# Patient Record
Sex: FEMALE | Race: WHITE | Employment: FULL TIME
[De-identification: names, ages, dates, MRNs, and addresses within clinical notes are randomized per-mention and may not be internally consistent; named-entity substitution may affect disease eponyms.]

---

## 2017-01-05 DIAGNOSIS — Z12.11 COLON CANCER SCREENING: ICD-10-CM

## 2017-01-05 LAB
CONTROL: PRESENT
HEMOCCULT STL QL: NORMAL

## 2017-01-05 PROCEDURE — 82274 ASSAY TEST FOR BLOOD FECAL: CPT | Performed by: NURSE PRACTITIONER

## 2017-01-09 ENCOUNTER — TELEPHONE (OUTPATIENT)
Dept: NEUROLOGY | Facility: CLINIC | Age: 62
End: 2017-01-09

## 2017-01-16 ENCOUNTER — OFFICE VISIT (OUTPATIENT)
Dept: NEUROLOGY | Facility: CLINIC | Age: 62
End: 2017-01-16

## 2017-01-16 VITALS
HEIGHT: 66 IN | WEIGHT: 186 LBS | HEART RATE: 76 BPM | BODY MASS INDEX: 29.89 KG/M2 | DIASTOLIC BLOOD PRESSURE: 88 MMHG | SYSTOLIC BLOOD PRESSURE: 138 MMHG

## 2017-01-16 DIAGNOSIS — G20 PARKINSON'S DISEASE (HCC): Primary | ICD-10-CM

## 2017-01-16 PROCEDURE — G8484 FLU IMMUNIZE NO ADMIN: HCPCS | Performed by: PSYCHIATRY & NEUROLOGY

## 2017-01-16 PROCEDURE — 3017F COLORECTAL CA SCREEN DOC REV: CPT | Performed by: PSYCHIATRY & NEUROLOGY

## 2017-01-16 PROCEDURE — G8419 CALC BMI OUT NRM PARAM NOF/U: HCPCS | Performed by: PSYCHIATRY & NEUROLOGY

## 2017-01-16 PROCEDURE — 1036F TOBACCO NON-USER: CPT | Performed by: PSYCHIATRY & NEUROLOGY

## 2017-01-16 PROCEDURE — 3014F SCREEN MAMMO DOC REV: CPT | Performed by: PSYCHIATRY & NEUROLOGY

## 2017-01-16 PROCEDURE — 99213 OFFICE O/P EST LOW 20 MIN: CPT | Performed by: PSYCHIATRY & NEUROLOGY

## 2017-01-16 PROCEDURE — G8427 DOCREV CUR MEDS BY ELIG CLIN: HCPCS | Performed by: PSYCHIATRY & NEUROLOGY

## 2017-01-16 RX ORDER — CARBIDOPA AND LEVODOPA 50; 200 MG/1; MG/1
1 TABLET, EXTENDED RELEASE ORAL NIGHTLY
Qty: 90 TABLET | Refills: 3 | Status: SHIPPED | OUTPATIENT
Start: 2017-01-16 | End: 2017-11-20 | Stop reason: SDUPTHER

## 2017-02-13 RX ORDER — MONTELUKAST SODIUM 10 MG/1
10 TABLET ORAL NIGHTLY
Qty: 90 TABLET | Refills: 1 | Status: SHIPPED | OUTPATIENT
Start: 2017-02-13 | End: 2018-07-03 | Stop reason: SDUPTHER

## 2017-02-16 RX ORDER — OMEPRAZOLE 20 MG/1
20 CAPSULE, DELAYED RELEASE ORAL DAILY
Qty: 90 CAPSULE | Refills: 1 | Status: SHIPPED | OUTPATIENT
Start: 2017-02-16 | End: 2017-08-15 | Stop reason: SDUPTHER

## 2017-05-22 ENCOUNTER — OFFICE VISIT (OUTPATIENT)
Dept: NEUROLOGY | Age: 62
End: 2017-05-22
Payer: COMMERCIAL

## 2017-05-22 VITALS
HEIGHT: 66 IN | BODY MASS INDEX: 29.41 KG/M2 | SYSTOLIC BLOOD PRESSURE: 160 MMHG | DIASTOLIC BLOOD PRESSURE: 94 MMHG | HEART RATE: 71 BPM | WEIGHT: 183 LBS

## 2017-05-22 DIAGNOSIS — G20 PARKINSON'S DISEASE (HCC): Primary | ICD-10-CM

## 2017-05-22 PROCEDURE — 3017F COLORECTAL CA SCREEN DOC REV: CPT | Performed by: PSYCHIATRY & NEUROLOGY

## 2017-05-22 PROCEDURE — 1036F TOBACCO NON-USER: CPT | Performed by: PSYCHIATRY & NEUROLOGY

## 2017-05-22 PROCEDURE — G8427 DOCREV CUR MEDS BY ELIG CLIN: HCPCS | Performed by: PSYCHIATRY & NEUROLOGY

## 2017-05-22 PROCEDURE — 3014F SCREEN MAMMO DOC REV: CPT | Performed by: PSYCHIATRY & NEUROLOGY

## 2017-05-22 PROCEDURE — G8419 CALC BMI OUT NRM PARAM NOF/U: HCPCS | Performed by: PSYCHIATRY & NEUROLOGY

## 2017-05-22 PROCEDURE — 99213 OFFICE O/P EST LOW 20 MIN: CPT | Performed by: PSYCHIATRY & NEUROLOGY

## 2017-05-22 RX ORDER — ROPINIROLE 1 MG/1
2 TABLET, FILM COATED ORAL 3 TIMES DAILY
Qty: 180 TABLET | Refills: 3 | Status: SHIPPED | OUTPATIENT
Start: 2017-05-22 | End: 2018-01-05 | Stop reason: SDUPTHER

## 2017-08-15 RX ORDER — OMEPRAZOLE 20 MG/1
20 CAPSULE, DELAYED RELEASE ORAL DAILY
Qty: 90 CAPSULE | Refills: 1 | Status: SHIPPED | OUTPATIENT
Start: 2017-08-15 | End: 2018-02-13 | Stop reason: SDUPTHER

## 2017-09-19 ENCOUNTER — OFFICE VISIT (OUTPATIENT)
Dept: FAMILY MEDICINE CLINIC | Age: 62
End: 2017-09-19
Payer: COMMERCIAL

## 2017-09-19 VITALS
TEMPERATURE: 98.8 F | SYSTOLIC BLOOD PRESSURE: 124 MMHG | BODY MASS INDEX: 30.02 KG/M2 | RESPIRATION RATE: 16 BRPM | DIASTOLIC BLOOD PRESSURE: 84 MMHG | WEIGHT: 186 LBS | HEART RATE: 80 BPM

## 2017-09-19 DIAGNOSIS — J01.90 ACUTE BACTERIAL SINUSITIS: Primary | ICD-10-CM

## 2017-09-19 DIAGNOSIS — R05.9 COUGH: ICD-10-CM

## 2017-09-19 DIAGNOSIS — B96.89 ACUTE BACTERIAL SINUSITIS: Primary | ICD-10-CM

## 2017-09-19 DIAGNOSIS — Z12.31 ENCOUNTER FOR SCREENING MAMMOGRAM FOR BREAST CANCER: ICD-10-CM

## 2017-09-19 DIAGNOSIS — J30.1 SEASONAL ALLERGIC RHINITIS DUE TO POLLEN: ICD-10-CM

## 2017-09-19 PROCEDURE — 3014F SCREEN MAMMO DOC REV: CPT | Performed by: NURSE PRACTITIONER

## 2017-09-19 PROCEDURE — 3017F COLORECTAL CA SCREEN DOC REV: CPT | Performed by: NURSE PRACTITIONER

## 2017-09-19 PROCEDURE — 99214 OFFICE O/P EST MOD 30 MIN: CPT | Performed by: NURSE PRACTITIONER

## 2017-09-19 PROCEDURE — 1036F TOBACCO NON-USER: CPT | Performed by: NURSE PRACTITIONER

## 2017-09-19 PROCEDURE — G8417 CALC BMI ABV UP PARAM F/U: HCPCS | Performed by: NURSE PRACTITIONER

## 2017-09-19 PROCEDURE — G8427 DOCREV CUR MEDS BY ELIG CLIN: HCPCS | Performed by: NURSE PRACTITIONER

## 2017-09-19 RX ORDER — FLUTICASONE PROPIONATE 50 MCG
1 SPRAY, SUSPENSION (ML) NASAL DAILY
Qty: 1 BOTTLE | Refills: 3 | Status: SHIPPED | OUTPATIENT
Start: 2017-09-19 | End: 2020-04-14 | Stop reason: SDUPTHER

## 2017-09-19 RX ORDER — AMOXICILLIN AND CLAVULANATE POTASSIUM 875; 125 MG/1; MG/1
1 TABLET, FILM COATED ORAL 2 TIMES DAILY
Qty: 20 TABLET | Refills: 0 | Status: SHIPPED | OUTPATIENT
Start: 2017-09-19 | End: 2017-09-29

## 2017-09-19 ASSESSMENT — ENCOUNTER SYMPTOMS
COUGH: 1
RHINORRHEA: 1
SORE THROAT: 1
SINUS PAIN: 1

## 2017-09-27 ASSESSMENT — ENCOUNTER SYMPTOMS
WHEEZING: 0
NAUSEA: 0
EYE DISCHARGE: 0
CHEST TIGHTNESS: 0
SHORTNESS OF BREATH: 0
VOMITING: 0
CONSTIPATION: 0
DIARRHEA: 0
ABDOMINAL PAIN: 0
SINUS PRESSURE: 1
EYE ITCHING: 0
BACK PAIN: 0
EYE PAIN: 0

## 2017-11-20 NOTE — TELEPHONE ENCOUNTER
Sergey Jain called in today. She said that she is waiting on her mail order shipment for Sinemet 25/100 and is out. She would like a local supply to go to [facility] in [place]. Sent through a 30d supply.

## 2017-11-27 ENCOUNTER — OFFICE VISIT (OUTPATIENT)
Dept: NEUROLOGY | Age: 62
End: 2017-11-27
Payer: COMMERCIAL

## 2017-11-27 VITALS
BODY MASS INDEX: 28.48 KG/M2 | HEART RATE: 73 BPM | HEIGHT: 66 IN | SYSTOLIC BLOOD PRESSURE: 140 MMHG | DIASTOLIC BLOOD PRESSURE: 82 MMHG | WEIGHT: 177.2 LBS

## 2017-11-27 DIAGNOSIS — G20 PARKINSON'S DISEASE (HCC): Primary | ICD-10-CM

## 2017-11-27 PROCEDURE — 3017F COLORECTAL CA SCREEN DOC REV: CPT | Performed by: PSYCHIATRY & NEUROLOGY

## 2017-11-27 PROCEDURE — 99213 OFFICE O/P EST LOW 20 MIN: CPT | Performed by: PSYCHIATRY & NEUROLOGY

## 2017-11-27 PROCEDURE — G8484 FLU IMMUNIZE NO ADMIN: HCPCS | Performed by: PSYCHIATRY & NEUROLOGY

## 2017-11-27 PROCEDURE — G8427 DOCREV CUR MEDS BY ELIG CLIN: HCPCS | Performed by: PSYCHIATRY & NEUROLOGY

## 2017-11-27 PROCEDURE — 1036F TOBACCO NON-USER: CPT | Performed by: PSYCHIATRY & NEUROLOGY

## 2017-11-27 PROCEDURE — 3014F SCREEN MAMMO DOC REV: CPT | Performed by: PSYCHIATRY & NEUROLOGY

## 2017-11-27 PROCEDURE — G8417 CALC BMI ABV UP PARAM F/U: HCPCS | Performed by: PSYCHIATRY & NEUROLOGY

## 2017-11-27 NOTE — PROGRESS NOTES
HPI:        Your patient, Erasto Sandoval returns for continuing neurologic care of her Parkinson's disease. She uses the direct dopamine agonist, Requip and continues to have a good response with no serious side effects. Sinemet is used and she has done very well with this and now enjoys 2 pills in the morning and at noon and 1 pill in the evening along with a slow release preparation at night. There have been no ill effects from any of her medications. There have been no other medical or surgical issues nor any recent trauma, infection/fever or intoxication to complicate her Parkinson's disease.                                       REVIEW OF SYSTEMS    CONSTITUTIONAL Weight: absent, Appetite: absent, Fatigue: absent      HEENT Ears: absent, Eyes: glasses, Visual disturbance: absent   RESPIRATORY Shortness of breath: absent, Cough: absent   CARDIOVASCULAR Chest pain: absent, Leg swelling :absent      GI Constipation: absent, Diarrhea: absent, Swallowing change: absent       Urinary frequency: absent, Urinary urgency: absent, Urinary incontinence: absent   MUSCULOSKELETAL Neck pain: absent, Back pain: absent, Stiffness: absent, Muscle pain: absent, Joint pain: absent,  Restless legs: absent   DERMATOLOGIC Hair loss: absent, Skin changes: absent   NEUROLOGIC Memory loss: absent, Confusion: absent, Seizures: absent Trouble walking or imbalance: absent, Dizziness: absent, Weakness: absent, Numbness: absent, Tremor: absent, Spasm: absent, Involuntary Movements: absent,  Speech difficulty: absent, Headache: absent, Light sensitivity: absent   PSYCHIATRIC Anxiety: absent, Hallucination: absent, Mood disorder: absent   HEMATOLOGIC Abnormal bleeding: present, Anemia: absent, Clotting disorder: absent, Lymph gland changes: absent                   Outpatient Prescriptions Marked as Taking for the 11/27/17 encounter (Office Visit) with Timur Prasad MD   Medication Sig Dispense Refill    carbidopa-levodopa (SINEMET)  MG per tablet TAKE 1 TABLET THREE TIMES A  tablet 1    carbidopa-levodopa (SINEMET)  MG per tablet TAKE 1 TABLET THREE TIMES A DAY 90 tablet 0    fluticasone (FLONASE) 50 MCG/ACT nasal spray 1 spray by Nasal route daily 1 Bottle 3    omeprazole (PRILOSEC) 20 MG delayed release capsule Take 1 capsule by mouth Daily 90 capsule 1    montelukast (SINGULAIR) 10 MG tablet Take 1 tablet by mouth nightly 90 tablet 1    albuterol sulfate HFA (VENTOLIN HFA) 108 (90 BASE) MCG/ACT inhaler Inhale 2 puffs into the lungs every 6 hours as needed for Wheezing or Shortness of Breath 1 Inhaler 0    rOPINIRole (REQUIP) 1 MG tablet 2 in am, 2 in afternoon and 1 at hs. 450 tablet 0                                          PHYSICAL EXAMINATION                                              .                                                                                                     General Appearance:  Alert, cooperative, no signs of distress, appears stated age, well dressed and groomed   Head:  Normocephalic, no signs of trauma   Eyes:  Conjunctiva/corneas clear;  eyelids intact   Ears:  Normal external ear and canals   Nose: Nares normal, mucosa normal   Throat: Lips and tongue normal; teeth normal; gums normal   Neck: Supple neck with intact flexion, extension and rotation; trachea midline; no adenopathy; thyroid: not enlarged; no carotid pulse abnormality   Back:   Symmetric, no curvature, ROM adequate   Lungs:   Respirations unlabored   Chest Wall:  No deformity   Heart:  Regular rate and rhythm   Abdomen:   No masses    Extremities: Extremities normal, no cyanosis, no edema   Pulses: Symmetric over head and neck   Skin: Skin color, texture normal, no rashes, no lesions   Lymph nodes: Cervical, supraclavicular nodes normal                                         NEUROLOGIC EXAMINATION    MENTAL STATUS: Alert, oriented, intact memory, no confusion, normal speech, normal language, no hallucination or delusion   CRANIAL NERVES: II     -      Visual fields intact to confrontation  III,IV,VI -  EOMs full, no afferent defect, no                      EDWIGE, no ptosis  V     -     Normal facial sensation  VII    -     Normal facial symmetry   VIII   -     Intact hearing  IX,X -     Symmetrical palate  XI    -     Symmetrical shoulder shrug  XII   -     Midline tongue, no atrophy, easy tongue protrusion    MOTOR FUNCTION: Normal bulk, mild cogwheel tone, normal power; minimal right hand resting tremor    SENSORY FUNCTION: Normal touch, normal pin, normal vibration   CEREBELLAR FUNCTION: Intact fine motor control over upper limbs     REFLEX FUNCTION: Symmetric but reduced, no perverted reflex   STATION and GAIT Normal station, normal gait with good arm swing; normal tandem               ASSESSMENT and  PLAN:      In summary, your patient, Micki Litten appears about the same. There are no new lateralizing or localizing neurologic deficits or significant side effects from the direct dopamine agonist, Requip and the dopamine agents, IR and CR Sinemet. She still has some very slight cogwheel tone in the upper limbs and when distracted she has a slight right hand resting tremor. There is no need for additional neurologic investigation or a change in therapy. She will keep up the same dose of Requip and the immediate and slow acting Sinemet therapies and keep me informed of any questions or new problems. If she feels that symptoms are worsening in the next 6 months she can certainly boost her immediate release Sinemet by another pill and use 2 pills tid. Meanwhile, I will have her return for reevaluation on a semiannual basis as long as she remains stable and comfortable.

## 2017-11-27 NOTE — COMMUNICATION BODY
hallucination or delusion   CRANIAL NERVES: II     -      Visual fields intact to confrontation  III,IV,VI -  EOMs full, no afferent defect, no                      EDWIGE, no ptosis  V     -     Normal facial sensation  VII    -     Normal facial symmetry   VIII   -     Intact hearing  IX,X -     Symmetrical palate  XI    -     Symmetrical shoulder shrug  XII   -     Midline tongue, no atrophy, easy tongue protrusion    MOTOR FUNCTION: Normal bulk, mild cogwheel tone, normal power; minimal right hand resting tremor    SENSORY FUNCTION: Normal touch, normal pin, normal vibration   CEREBELLAR FUNCTION: Intact fine motor control over upper limbs     REFLEX FUNCTION: Symmetric but reduced, no perverted reflex   STATION and GAIT Normal station, normal gait with good arm swing; normal tandem               ASSESSMENT and  PLAN:      In summary, your patient, Anjel Alcantar appears about the same. There are no new lateralizing or localizing neurologic deficits or significant side effects from the direct dopamine agonist, Requip and the dopamine agents, IR and CR Sinemet. She still has some very slight cogwheel tone in the upper limbs and when distracted she has a slight right hand resting tremor. There is no need for additional neurologic investigation or a change in therapy. She will keep up the same dose of Requip and the immediate and slow acting Sinemet therapies and keep me informed of any questions or new problems. If she feels that symptoms are worsening in the next 6 months she can certainly boost her immediate release Sinemet by another pill and use 2 pills tid. Meanwhile, I will have her return for reevaluation on a semiannual basis as long as she remains stable and comfortable.

## 2017-11-27 NOTE — LETTER
[facility] Neurology Specialist  [place] [address] [address] [address]  Phone: [phone]  Fax: [phone]    Natalio Reynolds MD        November 27, 2017     Brandy Lewis MD  [address] [place] [address] [address] [facility]    Patient: Robby Pittman  MR Number: [id number]  YOB: 1955  Date of Visit: 11/27/2017    Dear Dr. Brandy Lewis:      HPI:        Your patient, Robby Pittman returns for continuing neurologic care of her Parkinson's disease. She uses the direct dopamine agonist, Requip and continues to have a good response with no serious side effects. Sinemet is used and she has done very well with this and now enjoys 2 pills in the morning and at noon and 1 pill in the evening along with a slow release preparation at night. There have been no ill effects from any of her medications. There have been no other medical or surgical issues nor any recent trauma, infection/fever or intoxication to complicate her Parkinson's disease.                                       REVIEW OF SYSTEMS    CONSTITUTIONAL Weight: absent, Appetite: absent, Fatigue: absent      HEENT Ears: absent, Eyes: glasses, Visual disturbance: absent   RESPIRATORY Shortness of breath: absent, Cough: absent   CARDIOVASCULAR Chest pain: absent, Leg swelling :absent      GI Constipation: absent, Diarrhea: absent, Swallowing change: absent       Urinary frequency: absent, Urinary urgency: absent, Urinary incontinence: absent   MUSCULOSKELETAL Neck pain: absent, Back pain: absent, Stiffness: absent, Muscle pain: absent, Joint pain: absent,  Restless legs: absent   DERMATOLOGIC Hair loss: absent, Skin changes: absent   NEUROLOGIC Memory loss: absent, Confusion: absent, Seizures: absent Trouble walking or imbalance: absent, Dizziness: absent, Weakness: absent, Numbness: absent, Tremor: absent, Spasm: absent, Involuntary Movements: absent,  Speech difficulty: absent, Headache: absent, Light sensitivity: absent   PSYCHIATRIC Anxiety: absent, Hallucination: absent, Mood disorder: absent   HEMATOLOGIC Abnormal bleeding: present, Anemia: absent, Clotting disorder: absent, Lymph gland changes: absent                   Outpatient Prescriptions Marked as Taking for the 11/27/17 encounter (Office Visit) with Anya Wheeler MD   Medication Sig Dispense Refill    carbidopa-levodopa (SINEMET)  MG per tablet TAKE 1 TABLET THREE TIMES A  tablet 1    carbidopa-levodopa (SINEMET)  MG per tablet TAKE 1 TABLET THREE TIMES A DAY 90 tablet 0    fluticasone (FLONASE) 50 MCG/ACT nasal spray 1 spray by Nasal route daily 1 Bottle 3    omeprazole (PRILOSEC) 20 MG delayed release capsule Take 1 capsule by mouth Daily 90 capsule 1    montelukast (SINGULAIR) 10 MG tablet Take 1 tablet by mouth nightly 90 tablet 1    albuterol sulfate HFA (VENTOLIN HFA) 108 (90 BASE) MCG/ACT inhaler Inhale 2 puffs into the lungs every 6 hours as needed for Wheezing or Shortness of Breath 1 Inhaler 0    rOPINIRole (REQUIP) 1 MG tablet 2 in am, 2 in afternoon and 1 at hs. 450 tablet 0                                          PHYSICAL EXAMINATION                                              .                                                                                                     General Appearance:  Alert, cooperative, no signs of distress, appears stated age, well dressed and groomed   Head:  Normocephalic, no signs of trauma   Eyes:  Conjunctiva/corneas clear;  eyelids intact   Ears:  Normal external ear and canals   Nose: Nares normal, mucosa normal   Throat: Lips and tongue normal; teeth normal; gums normal   Neck: Supple neck with intact flexion, extension and rotation; trachea midline; no adenopathy; thyroid: not enlarged; no carotid pulse abnormality   Back:   Symmetric, no curvature, ROM adequate   Lungs:   Respirations unlabored Chest Wall:  No deformity   Heart:  Regular rate and rhythm   Abdomen:   No masses    Extremities: Extremities normal, no cyanosis, no edema   Pulses: Symmetric over head and neck   Skin: Skin color, texture normal, no rashes, no lesions   Lymph nodes: Cervical, supraclavicular nodes normal                                         NEUROLOGIC EXAMINATION    MENTAL STATUS: Alert, oriented, intact memory, no confusion, normal speech, normal language, no hallucination or delusion   CRANIAL NERVES: II     -      Visual fields intact to confrontation  III,IV,VI -  EOMs full, no afferent defect, no                      EDWIGE, no ptosis  V     -     Normal facial sensation  VII    -     Normal facial symmetry   VIII   -     Intact hearing  IX,X -     Symmetrical palate  XI    -     Symmetrical shoulder shrug  XII   -     Midline tongue, no atrophy, easy tongue protrusion    MOTOR FUNCTION: Normal bulk, mild cogwheel tone, normal power; minimal right hand resting tremor    SENSORY FUNCTION: Normal touch, normal pin, normal vibration   CEREBELLAR FUNCTION: Intact fine motor control over upper limbs     REFLEX FUNCTION: Symmetric but reduced, no perverted reflex   STATION and GAIT Normal station, normal gait with good arm swing; normal tandem               ASSESSMENT and  PLAN:      In summary, your patient, Kennedy Yañez appears about the same. There are no new lateralizing or localizing neurologic deficits or significant side effects from the direct dopamine agonist, Requip and the dopamine agents, IR and CR Sinemet. She still has some very slight cogwheel tone in the upper limbs and when distracted she has a slight right hand resting tremor. There is no need for additional neurologic investigation or a change in therapy.   She will keep up the same dose of Requip and the immediate and slow acting Sinemet therapies and keep me informed of any questions or new problems. If she feels that symptoms are worsening in the next 6 months she can certainly boost her immediate release Sinemet by another pill and use 2 pills tid. Meanwhile, I will have her return for reevaluation on a semiannual basis as long as she remains stable and comfortable. If you have questions, please do not hesitate to call me. I look forward to following Claudia Pandya along with you.     Sincerely,        Randall Leavitt MD

## 2017-12-24 DIAGNOSIS — G20 PARKINSON'S DISEASE (HCC): ICD-10-CM

## 2017-12-26 NOTE — TELEPHONE ENCOUNTER
Saw Dr. Meredith Doe in November for followup and he was continuing same medication. Following up with ASHLEIGH WELLINGTON in May.

## 2017-12-31 RX ORDER — CARBIDOPA AND LEVODOPA 50; 200 MG/1; MG/1
TABLET, EXTENDED RELEASE ORAL
Qty: 90 TABLET | Refills: 3 | Status: SHIPPED | OUTPATIENT
Start: 2017-12-31 | End: 2018-06-20 | Stop reason: SDUPTHER

## 2018-01-05 ENCOUNTER — OFFICE VISIT (OUTPATIENT)
Dept: FAMILY MEDICINE CLINIC | Age: 63
End: 2018-01-05
Payer: COMMERCIAL

## 2018-01-05 VITALS
SYSTOLIC BLOOD PRESSURE: 130 MMHG | WEIGHT: 171 LBS | HEART RATE: 90 BPM | TEMPERATURE: 97.2 F | RESPIRATION RATE: 19 BRPM | BODY MASS INDEX: 27.6 KG/M2 | DIASTOLIC BLOOD PRESSURE: 83 MMHG

## 2018-01-05 DIAGNOSIS — Z12.11 ENCOUNTER FOR SCREENING COLONOSCOPY: ICD-10-CM

## 2018-01-05 DIAGNOSIS — G20 PARKINSON'S DISEASE (HCC): ICD-10-CM

## 2018-01-05 DIAGNOSIS — Z12.31 SCREENING MAMMOGRAM, ENCOUNTER FOR: ICD-10-CM

## 2018-01-05 DIAGNOSIS — Z00.00 ROUTINE GENERAL MEDICAL EXAMINATION AT A HEALTH CARE FACILITY: Primary | ICD-10-CM

## 2018-01-05 DIAGNOSIS — Q82.8 ACCESSORY SKIN TAGS: ICD-10-CM

## 2018-01-05 DIAGNOSIS — Z11.59 NEED FOR HEPATITIS C SCREENING TEST: ICD-10-CM

## 2018-01-05 DIAGNOSIS — J45.20 MILD INTERMITTENT ASTHMA WITHOUT COMPLICATION: ICD-10-CM

## 2018-01-05 DIAGNOSIS — J30.1 CHRONIC SEASONAL ALLERGIC RHINITIS DUE TO POLLEN: ICD-10-CM

## 2018-01-05 DIAGNOSIS — Z11.4 SCREENING FOR HIV (HUMAN IMMUNODEFICIENCY VIRUS): ICD-10-CM

## 2018-01-05 PROCEDURE — G8484 FLU IMMUNIZE NO ADMIN: HCPCS | Performed by: NURSE PRACTITIONER

## 2018-01-05 PROCEDURE — 1036F TOBACCO NON-USER: CPT | Performed by: NURSE PRACTITIONER

## 2018-01-05 PROCEDURE — G8427 DOCREV CUR MEDS BY ELIG CLIN: HCPCS | Performed by: NURSE PRACTITIONER

## 2018-01-05 PROCEDURE — 99396 PREV VISIT EST AGE 40-64: CPT | Performed by: NURSE PRACTITIONER

## 2018-01-05 PROCEDURE — 3017F COLORECTAL CA SCREEN DOC REV: CPT | Performed by: NURSE PRACTITIONER

## 2018-01-05 PROCEDURE — G8417 CALC BMI ABV UP PARAM F/U: HCPCS | Performed by: NURSE PRACTITIONER

## 2018-01-05 PROCEDURE — 3014F SCREEN MAMMO DOC REV: CPT | Performed by: NURSE PRACTITIONER

## 2018-01-05 ASSESSMENT — PATIENT HEALTH QUESTIONNAIRE - PHQ9
SUM OF ALL RESPONSES TO PHQ QUESTIONS 1-9: 0
1. LITTLE INTEREST OR PLEASURE IN DOING THINGS: 0
2. FEELING DOWN, DEPRESSED OR HOPELESS: 0
SUM OF ALL RESPONSES TO PHQ9 QUESTIONS 1 & 2: 0

## 2018-01-05 ASSESSMENT — ENCOUNTER SYMPTOMS
COUGH: 0
ABDOMINAL PAIN: 0
CONSTIPATION: 0
BACK PAIN: 1
TROUBLE SWALLOWING: 0
DIARRHEA: 0
WHEEZING: 0
SHORTNESS OF BREATH: 0
EYE PAIN: 0
EYE DISCHARGE: 0
VOMITING: 0
SORE THROAT: 0
NAUSEA: 0
RHINORRHEA: 0

## 2018-01-05 NOTE — PROGRESS NOTES
face- proceed with referral to Plastics per request  Recommend healthy diet and exercise  Declined flu shot today Tdap  She is interested in getting Zostavax. Discussed new vaccine available but not distributed yet- will await new vaccine  Recommend complete screening mammogram  Recommend complete screening Fit testing  Call office with concerns        1. Ana Gonzalez received counseling on the following healthy behaviors: nutrition, exercise and medication adherence  2. Reviewed prior labs and health maintenance  3. Continue current medications, diet and exercise. 4.  Discussed use, benefit, and side effects of prescribed medications. Barriers to medication compliance addressed. All patient questions answered. Pt voiced understanding. 5.  Patient given educational materials - see patient instructions  6. Was a self-tracking handout given in paper form or via [employer]? No  If yes, see orders or list here.     PHQ Scores 1/5/2018 12/19/2016   PHQ2 Score 0 0   PHQ9 Score 0 0     Interpretation of Total Score Depression Severity: 1-4 = Minimal depression, 5-9 = Mild depression, 10-14 = Moderate depression, 15-19 = Moderately severe depression, 20-27 = Severe depression  Normal    Completed Refills   Requested Prescriptions      No prescriptions requested or ordered in this encounter

## 2018-01-17 DIAGNOSIS — Z12.11 ENCOUNTER FOR SCREENING COLONOSCOPY: ICD-10-CM

## 2018-01-17 LAB
CONTROL: PRESENT
HEMOCCULT STL QL: NEGATIVE

## 2018-01-17 PROCEDURE — 82274 ASSAY TEST FOR BLOOD FECAL: CPT | Performed by: NURSE PRACTITIONER

## 2018-02-14 RX ORDER — OMEPRAZOLE 20 MG/1
20 CAPSULE, DELAYED RELEASE ORAL DAILY
Qty: 90 CAPSULE | Refills: 1 | Status: SHIPPED | OUTPATIENT
Start: 2018-02-14 | End: 2018-08-13 | Stop reason: SDUPTHER

## 2018-03-08 ENCOUNTER — HOSPITAL ENCOUNTER (OUTPATIENT)
Age: 63
Setting detail: SPECIMEN
Discharge: HOME | End: 2018-03-08
Payer: COMMERCIAL

## 2018-03-12 LAB — DERMATOLOGY PATHOLOGY REPORT: NORMAL

## 2018-06-20 ENCOUNTER — OFFICE VISIT (OUTPATIENT)
Dept: NEUROLOGY | Age: 63
End: 2018-06-20
Payer: COMMERCIAL

## 2018-06-20 VITALS
SYSTOLIC BLOOD PRESSURE: 126 MMHG | DIASTOLIC BLOOD PRESSURE: 88 MMHG | WEIGHT: 176 LBS | HEART RATE: 79 BPM | BODY MASS INDEX: 28.28 KG/M2 | HEIGHT: 66 IN

## 2018-06-20 DIAGNOSIS — G20 PARKINSON'S DISEASE (HCC): Primary | ICD-10-CM

## 2018-06-20 DIAGNOSIS — G20 PARKINSON'S DISEASE (HCC): ICD-10-CM

## 2018-06-20 PROCEDURE — G8427 DOCREV CUR MEDS BY ELIG CLIN: HCPCS | Performed by: NURSE PRACTITIONER

## 2018-06-20 PROCEDURE — 1036F TOBACCO NON-USER: CPT | Performed by: NURSE PRACTITIONER

## 2018-06-20 PROCEDURE — 99214 OFFICE O/P EST MOD 30 MIN: CPT | Performed by: NURSE PRACTITIONER

## 2018-06-20 PROCEDURE — 3017F COLORECTAL CA SCREEN DOC REV: CPT | Performed by: NURSE PRACTITIONER

## 2018-06-20 PROCEDURE — G8417 CALC BMI ABV UP PARAM F/U: HCPCS | Performed by: NURSE PRACTITIONER

## 2018-06-20 RX ORDER — CARBIDOPA AND LEVODOPA 50; 200 MG/1; MG/1
1 TABLET, EXTENDED RELEASE ORAL 3 TIMES DAILY
Qty: 270 TABLET | Refills: 3 | Status: SHIPPED | OUTPATIENT
Start: 2018-06-20 | End: 2018-12-17 | Stop reason: SDUPTHER

## 2018-07-03 DIAGNOSIS — J45.20 MILD INTERMITTENT ASTHMA WITHOUT COMPLICATION: ICD-10-CM

## 2018-07-03 RX ORDER — ALBUTEROL SULFATE 90 UG/1
2 AEROSOL, METERED RESPIRATORY (INHALATION) EVERY 6 HOURS PRN
Qty: 1 INHALER | Refills: 0 | Status: SHIPPED | OUTPATIENT
Start: 2018-07-03 | End: 2020-01-31

## 2018-07-03 RX ORDER — MONTELUKAST SODIUM 10 MG/1
10 TABLET ORAL NIGHTLY
Qty: 90 TABLET | Refills: 1 | Status: SHIPPED | OUTPATIENT
Start: 2018-07-03 | End: 2018-12-31 | Stop reason: SDUPTHER

## 2018-07-03 NOTE — TELEPHONE ENCOUNTER
Last OV 1-5-18  Last refill on Singulair 2-13-17 #90 with 1 refill Albuterol 12-19-16 #1 with 0 refills. Patient had a lot of Singulair d/t was on Automatic refills from mail order so had a large supply on hand. Now patient is completely out and needed rescue inhaler which she has not refilled in 2 years.   Leaving to go out of town tomorrow  Can she get this sent to [facility] Maintenance   Topic Date Due    Hepatitis C screen  1955    HIV screen  07/23/1972    Breast cancer screen  07/23/2007    Shingles Vaccine (1 of 2 - 2 Dose Series) 07/23/2007    DTaP/Tdap/Td vaccine (1 - Tdap) 11/01/2018 (Originally 7/23/1976)    Flu vaccine (1) 11/01/2018 (Originally 9/1/2018)    Pneumococcal med risk (1 of 1 - PPSV23) 11/01/2018 (Originally 7/23/1976)    Colon Cancer Screen FIT/FOBT  01/17/2019    Cervical cancer screen  12/19/2019    Lipid screen  09/23/2020             (applicable per patient's age: Cancer Screenings, Depression Screening, Fall Risk Screening, Immunizations)    LDL Cholesterol (mg/dL)   Date Value   09/23/2015 137 (H)     AST (U/L)   Date Value   09/23/2015 23     ALT (U/L)   Date Value   09/23/2015 25     BUN (mg/dL)   Date Value   09/23/2015 15      (goal A1C is < 7)   (goal LDL is <100) need 30-50% reduction from baseline     BP Readings from Last 3 Encounters:   06/20/18 126/88   03/15/18 (!) 126/95   03/08/18 (!) 144/88    (goal /80)      All Future Testing planned in [facility]:  Lab Frequency Next Occurrence   ZAIDA Digital Screen Bilateral Once 09/19/2017   ZAIDA DIGITAL SCREEN W OR WO CAD BILATERAL Once 01/05/2018   HIV Screen Once 01/05/2018   Hepatitis C Antibody Once 01/05/2018       Next Visit Date:  Future Appointments  Date Time Provider Martine Mendez   12/17/2018 10:20 AM Cristopher Dutton, APRN - CNP Neuro Spec [id number]            Patient Active Problem List:     Parkinson's disease ([address]     Seasonal allergic rhinitis due to pollen     Asthma

## 2018-08-13 DIAGNOSIS — G20 PARKINSON'S DISEASE (HCC): Primary | ICD-10-CM

## 2018-08-14 RX ORDER — OMEPRAZOLE 20 MG/1
20 CAPSULE, DELAYED RELEASE ORAL DAILY
Qty: 90 CAPSULE | Refills: 1 | Status: SHIPPED | OUTPATIENT
Start: 2018-08-14 | End: 2019-01-21 | Stop reason: SDUPTHER

## 2018-10-12 RX ORDER — ROPINIROLE 1 MG/1
TABLET, FILM COATED ORAL
Qty: 360 TABLET | Refills: 0 | Status: SHIPPED | OUTPATIENT
Start: 2018-10-12 | End: 2018-12-17 | Stop reason: SDUPTHER

## 2018-12-17 ENCOUNTER — OFFICE VISIT (OUTPATIENT)
Dept: NEUROLOGY | Age: 63
End: 2018-12-17
Payer: COMMERCIAL

## 2018-12-17 VITALS
HEART RATE: 82 BPM | HEIGHT: 66 IN | SYSTOLIC BLOOD PRESSURE: 138 MMHG | WEIGHT: 182 LBS | DIASTOLIC BLOOD PRESSURE: 82 MMHG | BODY MASS INDEX: 29.25 KG/M2

## 2018-12-17 DIAGNOSIS — M54.12 CERVICAL RADICULOPATHY: ICD-10-CM

## 2018-12-17 DIAGNOSIS — G20 PARKINSON'S DISEASE (HCC): Primary | ICD-10-CM

## 2018-12-17 PROCEDURE — 99214 OFFICE O/P EST MOD 30 MIN: CPT | Performed by: NURSE PRACTITIONER

## 2018-12-17 PROCEDURE — 1036F TOBACCO NON-USER: CPT | Performed by: NURSE PRACTITIONER

## 2018-12-17 PROCEDURE — G8484 FLU IMMUNIZE NO ADMIN: HCPCS | Performed by: NURSE PRACTITIONER

## 2018-12-17 PROCEDURE — G8417 CALC BMI ABV UP PARAM F/U: HCPCS | Performed by: NURSE PRACTITIONER

## 2018-12-17 PROCEDURE — G8427 DOCREV CUR MEDS BY ELIG CLIN: HCPCS | Performed by: NURSE PRACTITIONER

## 2018-12-17 PROCEDURE — 3017F COLORECTAL CA SCREEN DOC REV: CPT | Performed by: NURSE PRACTITIONER

## 2018-12-17 RX ORDER — ROPINIROLE 1 MG/1
TABLET, FILM COATED ORAL
Qty: 450 TABLET | Refills: 2 | Status: SHIPPED | OUTPATIENT
Start: 2018-12-17 | End: 2019-09-16 | Stop reason: SDUPTHER

## 2018-12-17 RX ORDER — CARBIDOPA AND LEVODOPA 50; 200 MG/1; MG/1
1 TABLET, EXTENDED RELEASE ORAL NIGHTLY
Qty: 90 TABLET | Refills: 2 | Status: SHIPPED | OUTPATIENT
Start: 2018-12-17 | End: 2019-09-16 | Stop reason: SDUPTHER

## 2018-12-17 RX ORDER — TIZANIDINE 4 MG/1
4 TABLET ORAL NIGHTLY
Qty: 30 TABLET | Refills: 5 | Status: SHIPPED | OUTPATIENT
Start: 2018-12-17 | End: 2019-10-25 | Stop reason: SDUPTHER

## 2018-12-17 NOTE — PROGRESS NOTES
carbidopa-levodopa (SINEMET)  MG per tablet Take 2 tablets by mouth 2 times daily And one tablet in the evening 450 tablet 3    tiZANidine (ZANAFLEX) 4 MG tablet Take 1 tablet by mouth nightly 30 tablet 5    omeprazole (PRILOSEC) 20 MG delayed release capsule Take 1 capsule by mouth Daily 90 capsule 1    montelukast (SINGULAIR) 10 MG tablet Take 1 tablet by mouth nightly 90 tablet 1    albuterol sulfate HFA (VENTOLIN HFA) 108 (90 Base) MCG/ACT inhaler Inhale 2 puffs into the lungs every 6 hours as needed for Wheezing or Shortness of Breath 1 Inhaler 0    fluticasone (FLONASE) 50 MCG/ACT nasal spray 1 spray by Nasal route daily 1 Bottle 3     No current facility-administered medications for this visit. PHYSICAL EXAMINATION       /82 (Site: Left Upper Arm, Position: Sitting) Comment: manual recheck  Pulse 82   Ht 5' 6\" (1.676 m)   Wt 182 lb (82.6 kg)   LMP 02/20/2010   BMI 29.38 kg/m²                                             .                                                                                                       General Appearance:  Alert, cooperative, no signs of distress, appears stated age   Head:  Normocephalic, no signs of trauma   Eyes:  Conjunctiva/corneas clear;  eyelids intact   Ears:  Normal external ear and canals   Nose: Nares normal, mucosa normal, no drainage    Throat: Lips and tongue normal; teeth normal;  gums normal   Neck: Supple, intact flexion, extension and rotation;   trachea midline;  no adenopathy;   thyroid: not enlarged;   no carotid pulse abnormality   Back:   Symmetric, no curvature, ROM adequate   Lungs:   Respirations unlabored   Heart:  Regular rate and rhythm           Extremities: Extremities normal, no cyanosis, no edema   Pulses: Symmetric over head and neck   Skin: Skin color, texture normal, no rashes, no lesions                                             NEUROLOGIC EXAMINATION    Neurologic Exam Mental Status   Oriented to person, place, and time. Attention: normal.   Speech: speech is normal   Level of consciousness: alert  Normal comprehension. Cranial Nerves     CN II   Visual fields full to confrontation. CN III, IV, VI   Pupils are equal, round, and reactive to light. Extraocular motions are normal.     CN V   Facial sensation intact. CN VII   Facial expression full, symmetric. CN VIII   CN VIII normal.     CN IX, X   CN IX normal.     CN XI   CN XI normal.     CN XII   CN XII normal.     Motor Exam   Muscle bulk: normal  Overall muscle tone: normal  Right arm tone: cogwheel rigidity  Left arm tone: normal  Right arm pronator drift: absent  Left arm pronator drift: absent  Right leg tone: normal  Left leg tone: normal    Strength   Strength 5/5 throughout. Sensory Exam   Light touch normal.   Vibration normal.   Pinprick normal.     Gait, Coordination, and Reflexes     Gait  Gait: normal    Coordination   Finger to nose coordination: normal    Tremor   Resting tremor: present  Intention tremor: absent    Reflexes   Right brachioradialis: 2+  Left brachioradialis: 2+  Right biceps: 2+  Left biceps: 2+  Right triceps: 2+  Left triceps: 2+  Right patellar: 2+  Left patellar: 2+  Right achilles: 2+  Left achilles: 2+  Right plantar: normal  Left plantar: normal            ASSESSMENT/PLAN:       In summary, your patient, Gordon Reynaga exhibits the following, with associated plan:    1. Parkinson's disease which has remained relatively stable since her last visit in June 2018  1. Continue Requip 1 mg tablets 2 tablets twice daily and one in the evening  2. Continue carbidopa levodopa 25/100 mg 2 tablets twice daily and one in the evening  3. Continue Sinemet CR 50/200 mg 1 at bedtime daily  2. Probable cervical radiculopathy with bilateral neck pain associated with increased tone in her left lower leg  1. MRI of the cervical spine without contrast  2.  Zanaflex 4 mg at bedtime

## 2019-01-04 RX ORDER — MONTELUKAST SODIUM 10 MG/1
10 TABLET ORAL NIGHTLY
Qty: 90 TABLET | Refills: 1 | Status: SHIPPED | OUTPATIENT
Start: 2019-01-04 | End: 2019-07-21 | Stop reason: SDUPTHER

## 2019-01-21 ENCOUNTER — OFFICE VISIT (OUTPATIENT)
Dept: FAMILY MEDICINE CLINIC | Age: 64
End: 2019-01-21
Payer: COMMERCIAL

## 2019-01-21 VITALS
DIASTOLIC BLOOD PRESSURE: 86 MMHG | BODY MASS INDEX: 29.86 KG/M2 | WEIGHT: 185.8 LBS | RESPIRATION RATE: 16 BRPM | HEIGHT: 66 IN | HEART RATE: 80 BPM | SYSTOLIC BLOOD PRESSURE: 120 MMHG

## 2019-01-21 DIAGNOSIS — K21.9 GASTROESOPHAGEAL REFLUX DISEASE, ESOPHAGITIS PRESENCE NOT SPECIFIED: ICD-10-CM

## 2019-01-21 DIAGNOSIS — Z12.31 SCREENING MAMMOGRAM, ENCOUNTER FOR: ICD-10-CM

## 2019-01-21 DIAGNOSIS — Z00.00 ENCOUNTER FOR WELLNESS EXAMINATION IN ADULT: Primary | ICD-10-CM

## 2019-01-21 DIAGNOSIS — Z11.4 SCREENING FOR HIV (HUMAN IMMUNODEFICIENCY VIRUS): ICD-10-CM

## 2019-01-21 DIAGNOSIS — Z12.11 SCREEN FOR COLON CANCER: ICD-10-CM

## 2019-01-21 DIAGNOSIS — Z11.59 NEED FOR HEPATITIS C SCREENING TEST: ICD-10-CM

## 2019-01-21 DIAGNOSIS — G20 PARKINSON'S DISEASE (HCC): ICD-10-CM

## 2019-01-21 PROCEDURE — G8484 FLU IMMUNIZE NO ADMIN: HCPCS | Performed by: NURSE PRACTITIONER

## 2019-01-21 PROCEDURE — 99396 PREV VISIT EST AGE 40-64: CPT | Performed by: NURSE PRACTITIONER

## 2019-01-21 RX ORDER — OMEPRAZOLE 20 MG/1
20 CAPSULE, DELAYED RELEASE ORAL DAILY
Qty: 90 CAPSULE | Refills: 1 | Status: SHIPPED | OUTPATIENT
Start: 2019-01-21 | End: 2019-07-20 | Stop reason: SDUPTHER

## 2019-01-21 ASSESSMENT — ENCOUNTER SYMPTOMS
WHEEZING: 0
CONSTIPATION: 0
VOMITING: 0
BACK PAIN: 0
DIARRHEA: 0
NAUSEA: 0
TROUBLE SWALLOWING: 0
SORE THROAT: 0
EYE PAIN: 0
EYE DISCHARGE: 0
SHORTNESS OF BREATH: 0
COUGH: 0
RHINORRHEA: 1
ABDOMINAL PAIN: 0

## 2019-01-21 ASSESSMENT — PATIENT HEALTH QUESTIONNAIRE - PHQ9
SUM OF ALL RESPONSES TO PHQ9 QUESTIONS 1 & 2: 0
SUM OF ALL RESPONSES TO PHQ QUESTIONS 1-9: 0
2. FEELING DOWN, DEPRESSED OR HOPELESS: 0
SUM OF ALL RESPONSES TO PHQ QUESTIONS 1-9: 0
1. LITTLE INTEREST OR PLEASURE IN DOING THINGS: 0

## 2019-01-22 ENCOUNTER — HOSPITAL ENCOUNTER (OUTPATIENT)
Age: 64
Setting detail: SPECIMEN
Discharge: HOME | End: 2019-01-22
Payer: COMMERCIAL

## 2019-01-22 DIAGNOSIS — Z00.00 ENCOUNTER FOR WELLNESS EXAMINATION IN ADULT: ICD-10-CM

## 2019-01-22 DIAGNOSIS — Z11.59 NEED FOR HEPATITIS C SCREENING TEST: ICD-10-CM

## 2019-01-22 DIAGNOSIS — Z11.4 SCREENING FOR HIV (HUMAN IMMUNODEFICIENCY VIRUS): ICD-10-CM

## 2019-01-22 LAB
ALBUMIN SERPL-MCNC: 4.4 G/DL (ref 3.5–5.2)
ALBUMIN/GLOBULIN RATIO: 1.3 (ref 1–2.5)
ALP BLD-CCNC: 90 U/L (ref 35–104)
ALT SERPL-CCNC: <5 U/L (ref 5–33)
ANION GAP SERPL CALCULATED.3IONS-SCNC: 16 MMOL/L (ref 9–17)
AST SERPL-CCNC: 20 U/L
BILIRUB SERPL-MCNC: 0.35 MG/DL (ref 0.3–1.2)
BUN BLDV-MCNC: 20 MG/DL (ref 8–23)
BUN/CREAT BLD: ABNORMAL (ref 9–20)
CALCIUM SERPL-MCNC: 9.7 MG/DL (ref 8.6–10.4)
CHLORIDE BLD-SCNC: 103 MMOL/L (ref 98–107)
CHOLESTEROL/HDL RATIO: 3.7
CHOLESTEROL: 200 MG/DL
CO2: 23 MMOL/L (ref 20–31)
CREAT SERPL-MCNC: 0.79 MG/DL (ref 0.5–0.9)
ESTIMATED AVERAGE GLUCOSE: 103 MG/DL
GFR AFRICAN AMERICAN: >60 ML/MIN
GFR NON-AFRICAN AMERICAN: >60 ML/MIN
GFR SERPL CREATININE-BSD FRML MDRD: ABNORMAL ML/MIN/{1.73_M2}
GFR SERPL CREATININE-BSD FRML MDRD: ABNORMAL ML/MIN/{1.73_M2}
GLUCOSE BLD-MCNC: 80 MG/DL (ref 70–99)
HBA1C MFR BLD: 5.2 % (ref 4–6)
HCT VFR BLD CALC: 43.9 % (ref 36.3–47.1)
HDLC SERPL-MCNC: 54 MG/DL
HEMOGLOBIN: 13.8 G/DL (ref 11.9–15.1)
HEPATITIS C ANTIBODY: NONREACTIVE
HIV AG/AB: NONREACTIVE
LDL CHOLESTEROL: 119 MG/DL (ref 0–130)
MCH RBC QN AUTO: 30 PG (ref 25.2–33.5)
MCHC RBC AUTO-ENTMCNC: 31.4 G/DL (ref 28.4–34.8)
MCV RBC AUTO: 95.4 FL (ref 82.6–102.9)
NRBC AUTOMATED: 0 PER 100 WBC
PDW BLD-RTO: 12.6 % (ref 11.8–14.4)
PLATELET # BLD: 274 K/UL (ref 138–453)
PMV BLD AUTO: 10 FL (ref 8.1–13.5)
POTASSIUM SERPL-SCNC: 4.1 MMOL/L (ref 3.7–5.3)
RBC # BLD: 4.6 M/UL (ref 3.95–5.11)
SODIUM BLD-SCNC: 142 MMOL/L (ref 135–144)
TOTAL PROTEIN: 7.7 G/DL (ref 6.4–8.3)
TRIGL SERPL-MCNC: 134 MG/DL
TSH SERPL DL<=0.05 MIU/L-ACNC: 3.14 MIU/L (ref 0.3–5)
VLDLC SERPL CALC-MCNC: ABNORMAL MG/DL (ref 1–30)
WBC # BLD: 4.2 K/UL (ref 3.5–11.3)

## 2019-01-23 DIAGNOSIS — Z12.11 SCREEN FOR COLON CANCER: ICD-10-CM

## 2019-01-23 LAB
CONTROL: PRESENT
HEMOCCULT STL QL: NEGATIVE

## 2019-01-23 PROCEDURE — 82274 ASSAY TEST FOR BLOOD FECAL: CPT | Performed by: NURSE PRACTITIONER

## 2019-04-01 ENCOUNTER — OFFICE VISIT (OUTPATIENT)
Dept: NEUROLOGY | Age: 64
End: 2019-04-01
Payer: COMMERCIAL

## 2019-04-01 VITALS
SYSTOLIC BLOOD PRESSURE: 133 MMHG | BODY MASS INDEX: 29.86 KG/M2 | WEIGHT: 185.8 LBS | HEART RATE: 85 BPM | HEIGHT: 66 IN | DIASTOLIC BLOOD PRESSURE: 85 MMHG

## 2019-04-01 DIAGNOSIS — G47.00 INSOMNIA, UNSPECIFIED TYPE: ICD-10-CM

## 2019-04-01 DIAGNOSIS — G20 PARKINSON'S DISEASE (HCC): Primary | ICD-10-CM

## 2019-04-01 DIAGNOSIS — M54.2 NECK PAIN: ICD-10-CM

## 2019-04-01 PROCEDURE — G8417 CALC BMI ABV UP PARAM F/U: HCPCS | Performed by: NURSE PRACTITIONER

## 2019-04-01 PROCEDURE — 1036F TOBACCO NON-USER: CPT | Performed by: NURSE PRACTITIONER

## 2019-04-01 PROCEDURE — 3017F COLORECTAL CA SCREEN DOC REV: CPT | Performed by: NURSE PRACTITIONER

## 2019-04-01 PROCEDURE — 99214 OFFICE O/P EST MOD 30 MIN: CPT | Performed by: NURSE PRACTITIONER

## 2019-04-01 PROCEDURE — G8427 DOCREV CUR MEDS BY ELIG CLIN: HCPCS | Performed by: NURSE PRACTITIONER

## 2019-04-01 RX ORDER — LANOLIN ALCOHOL/MO/W.PET/CERES
6 CREAM (GRAM) TOPICAL DAILY
Qty: 60 TABLET | Refills: 3
Start: 2019-04-01 | End: 2020-02-03

## 2019-04-01 NOTE — PROGRESS NOTES
Health system            [address]          [place], [address] [facility]          Dept: [phone]          Dept Fax: [phone]        MD Mariana Henao MD Ahmed B. Meda Sandy, MD Regan Gains, MD Colie Lope, MD Dayla Clarke, CNP            4/1/2019      HISTORY OF PRESENT ILLNESS:       I had the pleasure of seeing Greyson Valdez, who returns for continuing neurologic care. Patient is a 71-year-old woman who was seen last on December 17, 2018 for management of Parkinsons disease. Patient was diagnosed in 2012 when she began having bilateral tremor. An MRI of the brain in 2014 was unremarkable. Previous laboratory testing includes a normal B12, folate, TSH, ESR, magnesium, phosphorus, liver enzymes, calcium, BUN, and creatinine. She did have an elevated rheumatoid factor and SSA along with a positive FRACISCO. She was evaluated by rheumatology in 2015 who found no evidence of autoimmune disease. Patient was initially started on Requip in February 2015 and in June, 2015 Sinemet was added. Currently, the patient takes Requip to 1 mg tablets twice daily and one in the evening along with carbidopa levodopa 25/100 mg, 2 tablets twice daily and one in the evening. The patient also takes Sinemet CR 50/200 mg, one tablet at bedtime. Patient had been complaining of bilateral neck pain with associated increased tone in her left lower extremity. Zanaflex 4 mg at bedtime daily was added. An MRI of the cervical spine was also ordered, but not completed. Patient is here today for reevaluation and has very minimal neck pain at this time. She continues to have tremor and notices a wearing off effect of her Sinemet. She is also having difficulty sleeping at night.   When she falls asleep she is able to stay asleep, but she oftentimes will lay there for 1-2 hours without being able to go to sleep. PAST MEDICAL HISTORY:         Diagnosis Date    Allergic rhinitis     Asthma     Bursitis     bilateral hips    GERD (gastroesophageal reflux disease)     Headache(784.0)     Narcolepsy     Parkinson disease ([facility] [address]     Tremor         PAST SURGICAL HISTORY:         Procedure Laterality Date    FACIAL RECONSTRUCTION SURGERY  1987        SOCIAL HISTORY:     Social History     Socioeconomic History    Marital status:      Spouse name: Not on file    Number of children: Not on file    Years of education: Not on file    Highest education level: Not on file   Occupational History    Not on file   Social Needs    Financial resource strain: Not on file    Food insecurity:     Worry: Not on file     Inability: Not on file    Transportation needs:     Medical: Not on file     Non-medical: Not on file   Tobacco Use    Smoking status: Passive Smoke Exposure - Never Smoker    Smokeless tobacco: Never Used   Substance and Sexual Activity    Alcohol use:  Yes     Alcohol/week: 0.0 oz     Comment: occasionally    Drug use: No    Sexual activity: Yes   Lifestyle    Physical activity:     Days per week: Not on file     Minutes per session: Not on file    Stress: Not on file   Relationships    Social connections:     Talks on phone: Not on file     Gets together: Not on file     Attends Buddhism service: Not on file     Active member of club or organization: Not on file     Attends meetings of clubs or organizations: Not on file     Relationship status: Not on file    Intimate partner violence:     Fear of current or ex partner: Not on file     Emotionally abused: Not on file     Physically abused: Not on file     Forced sexual activity: Not on file   Other Topics Concern    Not on file   Social History Narrative    Not on file       CURRENT MEDICATIONS:     Current Outpatient Medications   Medication Sig Dispense Refill    omeprazole (PRILOSEC) 20 MG delayed release capsule Take 1 capsule by mouth Daily 90 capsule 1    montelukast (SINGULAIR) 10 MG tablet Take 1 tablet by mouth nightly 90 tablet 1    rOPINIRole (REQUIP) 1 MG tablet Take 2 tablets BID and one tablet in the evening 450 tablet 2    carbidopa-levodopa (SINEMET CR)  MG per extended release tablet Take 1 tablet by mouth nightly 90 tablet 2    carbidopa-levodopa (SINEMET)  MG per tablet Take 2 tablets by mouth 2 times daily And one tablet in the evening 450 tablet 3    tiZANidine (ZANAFLEX) 4 MG tablet Take 1 tablet by mouth nightly 30 tablet 5    albuterol sulfate HFA (VENTOLIN HFA) 108 (90 Base) MCG/ACT inhaler Inhale 2 puffs into the lungs every 6 hours as needed for Wheezing or Shortness of Breath 1 Inhaler 0    fluticasone (FLONASE) 50 MCG/ACT nasal spray 1 spray by Nasal route daily 1 Bottle 3     No current facility-administered medications for this visit.          ALLERGIES:   No Known Allergies                                                REVIEW OF SYSTEMS    CONSTITUTIONAL Weight: absent, Appetite: absent, Fatigue: present      HEENT Ears: normal, Visual disturbance: absent   RESPIRATORY Shortness of breath: absent, Cough: absent   CARDIOVASCULAR Chest pain: absent, Leg swelling :absent      GI Constipation: absent, Diarrhea: absent, Swallowing change: absent       Urinary frequency: present, Urinary urgency: absent, Urinary incontinence: absent   MUSCULOSKELETAL Neck pain: absent, Back pain: absent, Stiffness: present, Muscle pain: absent, Joint pain: present Restless legs: absent   DERMATOLOGIC Hair loss: absent, Skin changes: absent   NEUROLOGIC Memory loss: absent, Confusion: absent, Seizures: absent Trouble walking or imbalance: absent, Dizziness: absent, Weakness: absent, Numbness: absent Tremor:present, Spasm: absent, Speech difficulty: absent, Headache: absent, Light sensitivity: absent   PSYCHIATRIC Anxiety: absent, Hallucination: absent, Mood disorder: absent   HEMATOLOGIC Abnormal bleeding: absent, Anemia: absent, Clotting disorder: absent, Lymph gland changes: absent           PHYSICAL EXAMINATION:       Vitals:    04/01/19 0926   BP: 133/85   Pulse: 85                                              .                                                                                                    General Appearance:  Alert, cooperative, no signs of distress, appears stated age   Head:  Normocephalic, no signs of trauma   Eyes:  Conjunctiva/corneas clear;  eyelids intact   Ears:  Normal external ear and canals   Nose: Nares normal, mucosa normal, no drainage    Throat: Lips and tongue normal; teeth normal;  gums normal   Neck: Supple, intact flexion, extension and rotation;   trachea midline;  no adenopathy;   thyroid: not enlarged;   no carotid pulse abnormality   Back:   Symmetric, no curvature, ROM adequate   Lungs:   Respirations unlabored   Heart:  Regular rate and rhythm           Extremities: Extremities normal, no cyanosis, no edema   Pulses: Symmetric over head and neck   Skin: Skin color, texture normal, no rashes, no lesions                                     NEUROLOGIC EXAMINATION    Neurologic Exam  Mental status    Alert and oriented x 3; intact memory with no confusion, speech or language problems; no hallucinations or delusions  Fund of information appropriate for level of education    Cranial nerves    II - visual fields intact to confrontation  III, IV, VI - extra-ocular muscles full: no pupillary defect; no EDWIGE, no nystagmus, no ptosis   V - normal facial sensation                                                               VII - normal facial symmetry                                                             VIII - intact hearing                                                                             IX, X - symmetrical palate                                                                  XI - symmetrical shoulder shrug XII - tongue midline without atrophy or fasciculation      Motor function  Normal muscle bulk and tone; strength 5/5 on all 4 extremities, no pronator drift      Sensory function Intact to light touch, pinprick, vibration, proprioception on all 4 extremities      Cerebellar Intact fine motor movement. No involuntary movements or tremors. No ataxia or dysmetria on finger to nose or heel to shin testing. Patient displays cogwheeling in her right arm and hand      Reflex function DTR 2+ on bilateral UE and LE, symmetric. Negative Babinski      Gait                   normal base and arm swing                  ASSESSMENT AND PLAN:           In summary, your patient, Filippo Singh exhibits the following, with associated plan:    1. Parkinson's disease  1. Continue Sinemet 25/100 mg, 2 tablets twice daily and one at bedtime  2. Continue Requip 1 mg, 2 tablets twice daily and one at bedtime  3. Continue Sinemet CR 50/200 mg 1 tablet at bedtime  4. If the change in therapy as needed, we will likely increase her daily medications to a 25/250 mg 3 times daily  2. Neck pain  1. Continue Zanaflex 4 mg at bedtime as needed  3. Insomnia  1.  Melatonin 6 mg at bedtime            Signed: Shree Cantu CNP      *Please note that portions of this note were completed with a voice recognition program.  Although every effort was made to insure the accuracy of this automated transcription, some errors in transcription may have occurred, occasionally words and are mis-transcribed

## 2019-07-20 DIAGNOSIS — K21.9 GASTROESOPHAGEAL REFLUX DISEASE, ESOPHAGITIS PRESENCE NOT SPECIFIED: ICD-10-CM

## 2019-07-21 DIAGNOSIS — J30.1 SEASONAL ALLERGIC RHINITIS DUE TO POLLEN: Primary | ICD-10-CM

## 2019-07-22 RX ORDER — MONTELUKAST SODIUM 10 MG/1
10 TABLET ORAL NIGHTLY
Qty: 90 TABLET | Refills: 1 | Status: SHIPPED | OUTPATIENT
Start: 2019-07-22 | End: 2020-01-31

## 2019-07-22 RX ORDER — OMEPRAZOLE 20 MG/1
20 CAPSULE, DELAYED RELEASE ORAL DAILY
Qty: 90 CAPSULE | Refills: 1 | Status: SHIPPED | OUTPATIENT
Start: 2019-07-22 | End: 2020-01-20

## 2019-09-16 DIAGNOSIS — G20 PARKINSON'S DISEASE (HCC): ICD-10-CM

## 2019-09-16 RX ORDER — CARBIDOPA AND LEVODOPA 50; 200 MG/1; MG/1
TABLET, EXTENDED RELEASE ORAL
Qty: 90 TABLET | Refills: 0 | Status: SHIPPED | OUTPATIENT
Start: 2019-09-16 | End: 2021-01-11 | Stop reason: SDUPTHER

## 2019-09-16 RX ORDER — ROPINIROLE 1 MG/1
TABLET, FILM COATED ORAL
Qty: 450 TABLET | Refills: 0 | Status: SHIPPED | OUTPATIENT
Start: 2019-09-16 | End: 2019-11-04

## 2019-10-28 RX ORDER — TIZANIDINE 4 MG/1
4 TABLET ORAL NIGHTLY
Qty: 30 TABLET | Refills: 3 | Status: SHIPPED | OUTPATIENT
Start: 2019-10-28 | End: 2019-11-04 | Stop reason: SDUPTHER

## 2019-11-04 ENCOUNTER — OFFICE VISIT (OUTPATIENT)
Dept: NEUROLOGY | Age: 64
End: 2019-11-04
Payer: COMMERCIAL

## 2019-11-04 VITALS
DIASTOLIC BLOOD PRESSURE: 91 MMHG | SYSTOLIC BLOOD PRESSURE: 138 MMHG | HEART RATE: 73 BPM | WEIGHT: 184.2 LBS | HEIGHT: 66 IN | BODY MASS INDEX: 29.6 KG/M2

## 2019-11-04 DIAGNOSIS — G20 PARKINSON'S DISEASE (HCC): Primary | ICD-10-CM

## 2019-11-04 DIAGNOSIS — R20.0 NUMBNESS IN FEET: ICD-10-CM

## 2019-11-04 DIAGNOSIS — M54.2 NECK PAIN: ICD-10-CM

## 2019-11-04 PROCEDURE — 99214 OFFICE O/P EST MOD 30 MIN: CPT | Performed by: NURSE PRACTITIONER

## 2019-11-04 PROCEDURE — 3017F COLORECTAL CA SCREEN DOC REV: CPT | Performed by: NURSE PRACTITIONER

## 2019-11-04 PROCEDURE — G8417 CALC BMI ABV UP PARAM F/U: HCPCS | Performed by: NURSE PRACTITIONER

## 2019-11-04 PROCEDURE — G8427 DOCREV CUR MEDS BY ELIG CLIN: HCPCS | Performed by: NURSE PRACTITIONER

## 2019-11-04 PROCEDURE — G8484 FLU IMMUNIZE NO ADMIN: HCPCS | Performed by: NURSE PRACTITIONER

## 2019-11-04 PROCEDURE — 1036F TOBACCO NON-USER: CPT | Performed by: NURSE PRACTITIONER

## 2019-11-04 RX ORDER — CARBIDOPA/LEVODOPA 25MG-250MG
1 TABLET ORAL 3 TIMES DAILY
Qty: 270 TABLET | Refills: 3 | Status: SHIPPED | OUTPATIENT
Start: 2019-11-04 | End: 2021-01-11 | Stop reason: SDUPTHER

## 2019-11-04 RX ORDER — MELOXICAM 15 MG/1
TABLET ORAL
Refills: 0 | COMMUNITY
Start: 2019-08-14 | End: 2020-09-17

## 2019-11-04 RX ORDER — AMITRIPTYLINE HYDROCHLORIDE 25 MG/1
25 TABLET, FILM COATED ORAL NIGHTLY
Qty: 90 TABLET | Refills: 3 | Status: SHIPPED | OUTPATIENT
Start: 2019-11-04 | End: 2020-10-12

## 2019-11-04 RX ORDER — TIZANIDINE 4 MG/1
4 TABLET ORAL NIGHTLY
Qty: 90 TABLET | Refills: 3 | Status: SHIPPED | OUTPATIENT
Start: 2019-11-04 | End: 2020-04-14 | Stop reason: SDUPTHER

## 2019-11-04 RX ORDER — ROPINIROLE 3 MG/1
3 TABLET, FILM COATED ORAL 3 TIMES DAILY
Qty: 270 TABLET | Refills: 3 | Status: SHIPPED | OUTPATIENT
Start: 2019-11-04 | End: 2020-09-17 | Stop reason: SDUPTHER

## 2019-11-15 ENCOUNTER — HOSPITAL ENCOUNTER (OUTPATIENT)
Dept: MRI IMAGING | Facility: CLINIC | Age: 64
Discharge: HOME | End: 2019-11-17
Payer: COMMERCIAL

## 2019-11-15 DIAGNOSIS — R20.0 NUMBNESS IN FEET: ICD-10-CM

## 2019-11-15 DIAGNOSIS — G20 PARKINSON'S DISEASE (HCC): ICD-10-CM

## 2019-11-15 DIAGNOSIS — M54.2 NECK PAIN: ICD-10-CM

## 2019-11-15 PROCEDURE — 72141 MRI NECK SPINE W/O DYE: CPT

## 2019-12-16 ENCOUNTER — TELEPHONE (OUTPATIENT)
Dept: FAMILY MEDICINE CLINIC | Age: 64
End: 2019-12-16

## 2019-12-19 ENCOUNTER — OFFICE VISIT (OUTPATIENT)
Dept: FAMILY MEDICINE CLINIC | Age: 64
End: 2019-12-19
Payer: COMMERCIAL

## 2019-12-19 VITALS
BODY MASS INDEX: 30.34 KG/M2 | HEART RATE: 80 BPM | WEIGHT: 188 LBS | TEMPERATURE: 97.9 F | DIASTOLIC BLOOD PRESSURE: 80 MMHG | SYSTOLIC BLOOD PRESSURE: 126 MMHG | RESPIRATION RATE: 18 BRPM

## 2019-12-19 DIAGNOSIS — J01.90 ACUTE BACTERIAL SINUSITIS: Primary | ICD-10-CM

## 2019-12-19 DIAGNOSIS — B96.89 ACUTE BACTERIAL SINUSITIS: Primary | ICD-10-CM

## 2019-12-19 PROCEDURE — 1036F TOBACCO NON-USER: CPT | Performed by: PHYSICIAN ASSISTANT

## 2019-12-19 PROCEDURE — G8417 CALC BMI ABV UP PARAM F/U: HCPCS | Performed by: PHYSICIAN ASSISTANT

## 2019-12-19 PROCEDURE — 99213 OFFICE O/P EST LOW 20 MIN: CPT | Performed by: PHYSICIAN ASSISTANT

## 2019-12-19 PROCEDURE — G8484 FLU IMMUNIZE NO ADMIN: HCPCS | Performed by: PHYSICIAN ASSISTANT

## 2019-12-19 PROCEDURE — 3017F COLORECTAL CA SCREEN DOC REV: CPT | Performed by: PHYSICIAN ASSISTANT

## 2019-12-19 PROCEDURE — G8427 DOCREV CUR MEDS BY ELIG CLIN: HCPCS | Performed by: PHYSICIAN ASSISTANT

## 2019-12-19 RX ORDER — AMOXICILLIN AND CLAVULANATE POTASSIUM 875; 125 MG/1; MG/1
1 TABLET, FILM COATED ORAL 2 TIMES DAILY
Qty: 20 TABLET | Refills: 0 | Status: SHIPPED | OUTPATIENT
Start: 2019-12-19 | End: 2020-04-14 | Stop reason: SDUPTHER

## 2019-12-19 RX ORDER — PREDNISONE 20 MG/1
20 TABLET ORAL 2 TIMES DAILY
Qty: 10 TABLET | Refills: 0 | Status: SHIPPED | OUTPATIENT
Start: 2019-12-19 | End: 2019-12-24

## 2020-01-10 ENCOUNTER — TELEPHONE (OUTPATIENT)
Dept: FAMILY MEDICINE CLINIC | Age: 65
End: 2020-01-10

## 2020-01-10 NOTE — TELEPHONE ENCOUNTER
I agree with Marcelo Adjutant; Please advise the patient to be seen in office or at go to one of our Walk-ins for re-eval

## 2020-01-20 NOTE — TELEPHONE ENCOUNTER
Last visit: 1/21/19  Last Med refill: 7/22/19  Does patient have enough medication for 72 hours: No:     Next Visit Date:  Future Appointments   Date Time Provider Martine Mendez   2/3/2020 10:20 AM SHIVA Noriega CNP Neuro [address] [facility]   Topic Date Due    Pneumococcal 0-64 years Vaccine (1 of 1 - PPSV23) 07/23/1963    DTaP/Tdap/Td vaccine (1 - Tdap) 07/23/1968    Breast cancer screen  07/23/2007    Shingles Vaccine (1 of 2) 07/23/2007    Flu vaccine (1) 09/01/2019    Cervical cancer screen  12/19/2019    Colon Cancer Screen FIT/FOBT  01/22/2020    Lipid screen  01/22/2024    Hepatitis C screen  Completed    HIV screen  Completed       Hemoglobin A1C (%)   Date Value   01/22/2019 5.2             ( goal A1C is < 7)   No results found for: LABMICR  LDL Cholesterol (mg/dL)   Date Value   01/22/2019 119   09/23/2015 137 (H)       (goal LDL is <100)   AST (U/L)   Date Value   01/22/2019 20     ALT (U/L)   Date Value   01/22/2019 <5 (L)     BUN (mg/dL)   Date Value   01/22/2019 20     BP Readings from Last 3 Encounters:   12/19/19 126/80   11/04/19 (!) 138/91   04/01/19 133/85          (goal 120/80)    All Future Testing planned in [facility]  Lab Frequency Next Occurrence   ZAIDA DIGITAL SCREEN W CAD BILATERAL Once 01/28/2020               Patient Active Problem List:     Parkinson's disease ([facility] [address]     Seasonal allergic rhinitis due to pollen     Asthma     Gastroesophageal reflux disease     Neck pain

## 2020-01-21 RX ORDER — OMEPRAZOLE 20 MG/1
20 CAPSULE, DELAYED RELEASE ORAL DAILY
Qty: 90 CAPSULE | Refills: 1 | Status: SHIPPED | OUTPATIENT
Start: 2020-01-21 | End: 2020-07-20

## 2020-01-31 NOTE — TELEPHONE ENCOUNTER
LRF (Singulair) 7/22/2019 #90 RF1   (Ventalin) 7/3/2018 #1 RF0  LOV 1/21/2019  RTO 6 Months    Health Maintenance   Topic Date Due    Pneumococcal 0-64 years Vaccine (1 of 1 - PPSV23) 07/23/1963    DTaP/Tdap/Td vaccine (1 - Tdap) 07/23/1968    Breast cancer screen  07/23/2007    Shingles Vaccine (1 of 2) 07/23/2007    Flu vaccine (1) 09/01/2019    Cervical cancer screen  12/19/2019    Colon Cancer Screen FIT/FOBT  01/22/2020    Lipid screen  01/22/2024    Hepatitis C screen  Completed    HIV screen  Completed             (applicable per patient's age: Cancer Screenings, Depression Screening, Fall Risk Screening, Immunizations)    Hemoglobin A1C (%)   Date Value   01/22/2019 5.2     LDL Cholesterol (mg/dL)   Date Value   01/22/2019 119     AST (U/L)   Date Value   01/22/2019 20     ALT (U/L)   Date Value   01/22/2019 <5 (L)     BUN (mg/dL)   Date Value   01/22/2019 20      (goal A1C is < 7)   (goal LDL is <100) need 30-50% reduction from baseline     BP Readings from Last 3 Encounters:   12/19/19 126/80   11/04/19 (!) 138/91   04/01/19 133/85    (goal /80)      All Future Testing planned in [facility]:  Lab Frequency Next Occurrence       Next Visit Date:  Future Appointments   Date Time Provider Martine Mendez   2/3/2020 10:20 AM SHIVA Sahu - CNP Neuro [id number]            Patient Active Problem List:     Parkinson's disease ([facility] [address]     Seasonal allergic rhinitis due to pollen     Asthma     Gastroesophageal reflux disease     Neck pain

## 2020-02-02 RX ORDER — ALBUTEROL SULFATE 90 UG/1
2 AEROSOL, METERED RESPIRATORY (INHALATION) EVERY 6 HOURS PRN
Qty: 1 INHALER | Refills: 2 | Status: SHIPPED | OUTPATIENT
Start: 2020-02-02 | End: 2022-10-14

## 2020-02-02 RX ORDER — MONTELUKAST SODIUM 10 MG/1
10 TABLET ORAL NIGHTLY
Qty: 90 TABLET | Refills: 1 | Status: SHIPPED | OUTPATIENT
Start: 2020-02-02 | End: 2020-08-11

## 2020-02-03 ENCOUNTER — OFFICE VISIT (OUTPATIENT)
Dept: NEUROLOGY | Age: 65
End: 2020-02-03
Payer: COMMERCIAL

## 2020-02-03 VITALS
WEIGHT: 189.2 LBS | HEIGHT: 66 IN | SYSTOLIC BLOOD PRESSURE: 128 MMHG | DIASTOLIC BLOOD PRESSURE: 82 MMHG | BODY MASS INDEX: 30.41 KG/M2

## 2020-02-03 PROCEDURE — G8417 CALC BMI ABV UP PARAM F/U: HCPCS | Performed by: NURSE PRACTITIONER

## 2020-02-03 PROCEDURE — 1036F TOBACCO NON-USER: CPT | Performed by: NURSE PRACTITIONER

## 2020-02-03 PROCEDURE — G8484 FLU IMMUNIZE NO ADMIN: HCPCS | Performed by: NURSE PRACTITIONER

## 2020-02-03 PROCEDURE — 99214 OFFICE O/P EST MOD 30 MIN: CPT | Performed by: NURSE PRACTITIONER

## 2020-02-03 PROCEDURE — 3017F COLORECTAL CA SCREEN DOC REV: CPT | Performed by: NURSE PRACTITIONER

## 2020-02-03 PROCEDURE — G8427 DOCREV CUR MEDS BY ELIG CLIN: HCPCS | Performed by: NURSE PRACTITIONER

## 2020-02-03 NOTE — PROGRESS NOTES
complaining of significant plane in her left hip. It is been present for at least 4-5 months. The pain is a 6/10 most of the time in her left hip and can escalate to a 10/10 with increased activity. She now has pain in her right foot which is secondary to a plantar fasciitis which she recently was prescribed a insert for her shoe. She denies any significant low back pain and she denies any radiation of the left hip discomfort into her leg. Patient denies any side effects to her Parkinson's disease medications such as dyskinesias with an escalated dosage. Prior testing reviewed:              PAST MEDICAL HISTORY:         Diagnosis Date    Allergic rhinitis     Asthma     Bursitis     bilateral hips    GERD (gastroesophageal reflux disease)     Headache(784.0)     Narcolepsy     Parkinson disease (HCC)     Tremor         PAST SURGICAL HISTORY:         Procedure Laterality Date    FACIAL RECONSTRUCTION SURGERY  1987        SOCIAL HISTORY:     Social History     Socioeconomic History    Marital status:      Spouse name: Not on file    Number of children: Not on file    Years of education: Not on file    Highest education level: Not on file   Occupational History    Not on file   Social Needs    Financial resource strain: Not on file    Food insecurity:     Worry: Not on file     Inability: Not on file    Transportation needs:     Medical: Not on file     Non-medical: Not on file   Tobacco Use    Smoking status: Passive Smoke Exposure - Never Smoker    Smokeless tobacco: Never Used   Substance and Sexual Activity    Alcohol use:  Yes     Alcohol/week: 0.0 standard drinks     Comment: occasionally    Drug use: No    Sexual activity: Yes   Lifestyle    Physical activity:     Days per week: Not on file     Minutes per session: Not on file    Stress: Not on file   Relationships    Social connections:     Talks on phone: Not on file     Gets together: Not on file Swallowing change: absent       Urinary frequency: absent, Urinary urgency: absent,   MUSCULOSKELETAL Neck pain: absent, Back pain: absent, Stiffness: present, Muscle pain: absent, Joint pain: present Restless legs: absent   DERMATOLOGIC Hair loss: absent, Skin changes: absent   NEUROLOGIC Memory loss: absent, Confusion: absent, Seizures: absent Trouble walking or imbalance: absent, Dizziness: absent, Weakness: absent, Numbness: absent Tremor: present, Spasm: absent, Speech difficulty: absent, Headache: absent, Light sensitivity: absent   PSYCHIATRIC Anxiety: absent, Hallucination: absent, Mood disorder: absent   HEMATOLOGIC Abnormal bleeding: present, Anemia: absent,            PHYSICAL EXAMINATION:       Vitals:    02/03/20 1038   BP: 128/82                                              .                                                                                                     General Appearance:  Alert, cooperative, no signs of distress, appears stated age   Head:  Normocephalic, no signs of trauma   Eyes:  Conjunctiva/corneas clear;  eyelids intact   Ears:  Normal external ear and canals   Nose: Nares normal, mucosa normal, no drainage    Throat: Lips and tongue normal; teeth normal;  gums normal   Neck: Supple, intact flexion, extension and rotation;   trachea midline;  no adenopathy;   thyroid: not enlarged;   no carotid pulse abnormality   Back:   Symmetric, no curvature, ROM adequate   Lungs:   Respirations unlabored   Heart:  Regular rate and rhythm           Extremities: Extremities normal, no cyanosis, no edema   Pulses: Symmetric over head and neck   Skin: Skin color, texture normal, no rashes, no lesions                                     NEUROLOGIC EXAMINATION    Neurologic Exam  Mental status    Alert and oriented x 3; intact memory with no confusion, speech or language problems; no hallucinations or delusions  Fund of information appropriate for level of education    Cranial nerves    II - visual fields intact to confrontation bilaterally  III, IV, VI - extra-ocular muscles full: no pupillary defect; no DEWIGE, no nystagmus, no ptosis   V - normal facial sensation                                                               VII - normal facial symmetry                                                             VIII - intact hearing                                                                             IX, X - symmetrical palate                                                                  XI - symmetrical shoulder shrug                                                       XII - tongue midline without atrophy or fasciculation      Motor function  Normal muscle bulk and tone; strength 5/5 on all 4 extremities, no pronator drift      Sensory function Intact to light touch, pinprick, vibration, proprioception on all 4 extremities      Cerebellar Intact fine motor movement. No involuntary movements or tremors. No ataxia or dysmetria on finger to nose or heel to shin testing      Reflex function DTR 2+ on bilateral UE and LE, symmetric. Negative Babinski      Gait                   normal base and arm swing                  ASSESSMENT AND PLAN:           In summary, your patient, Ezequiel Dorado exhibits the following, with associated plan:    1. Parkinson's disease which is stable at this time  1. Continue Sinemet 25/250 mg 1 tablet 3 times daily  2. Continue Requip 3 mg 3 times daily  3. Continue Sinemet CR 50/200 mg 1 tablet at bedtime  2. Left hip pain  1. Referral to orthopedics  3. Cervical radiculopathy which is improved with the addition of amitriptyline and Zanaflex  1. Continue amitriptyline 25 mg at bedtime daily  2.  Continue Zanaflex 4 mg at bedtime daily            Signed: Georgiana Gomes CNP      *Please note that portions of this note were completed with a voice recognition program.  Although every effort was made to insure the accuracy of this automated transcription, some errors in

## 2020-03-12 LAB — URIC ACID: 5 MG/DL (ref 2.6–6)

## 2020-04-14 ENCOUNTER — TELEMEDICINE (OUTPATIENT)
Dept: FAMILY MEDICINE CLINIC | Age: 65
End: 2020-04-14
Payer: COMMERCIAL

## 2020-04-14 VITALS — TEMPERATURE: 96.8 F | WEIGHT: 186 LBS | BODY MASS INDEX: 29.89 KG/M2 | HEIGHT: 66 IN

## 2020-04-14 PROCEDURE — 99213 OFFICE O/P EST LOW 20 MIN: CPT | Performed by: NURSE PRACTITIONER

## 2020-04-14 RX ORDER — TIZANIDINE 4 MG/1
4 TABLET ORAL NIGHTLY
Qty: 90 TABLET | Refills: 3 | Status: SHIPPED | OUTPATIENT
Start: 2020-04-14 | End: 2021-01-11 | Stop reason: SDUPTHER

## 2020-04-14 RX ORDER — AMOXICILLIN AND CLAVULANATE POTASSIUM 875; 125 MG/1; MG/1
1 TABLET, FILM COATED ORAL 2 TIMES DAILY
Qty: 20 TABLET | Refills: 0 | Status: SHIPPED | OUTPATIENT
Start: 2020-04-14 | End: 2020-04-24

## 2020-04-14 RX ORDER — FLUTICASONE PROPIONATE 50 MCG
1 SPRAY, SUSPENSION (ML) NASAL DAILY
Qty: 1 BOTTLE | Refills: 3 | Status: SHIPPED | OUTPATIENT
Start: 2020-04-14 | End: 2020-09-17

## 2020-04-14 ASSESSMENT — PATIENT HEALTH QUESTIONNAIRE - PHQ9
SUM OF ALL RESPONSES TO PHQ QUESTIONS 1-9: 0
SUM OF ALL RESPONSES TO PHQ QUESTIONS 1-9: 0
2. FEELING DOWN, DEPRESSED OR HOPELESS: 0
1. LITTLE INTEREST OR PLEASURE IN DOING THINGS: 0
SUM OF ALL RESPONSES TO PHQ9 QUESTIONS 1 & 2: 0

## 2020-04-14 NOTE — PROGRESS NOTES
Judgment normal.         Due to this being a TeleHealth encounter, evaluation of the following organ systems is limited: Vitals/Constitutional/EENT/Resp/CV/GI//MS/Neuro/Skin/Heme-Lymph-Imm. Assessment:  1. Seasonal allergic rhinitis due to pollen  Worsening  Start fluticasone (FLONASE) 50 MCG/ACT nasal spray; 1 spray by Nasal route daily  Dispense: 1 Bottle; Refill: 3    2. Acute recurrent maxillary sinusitis  Worsening   Start  amoxicillin-clavulanate (AUGMENTIN) 875-125 MG per tablet; Take 1 tablet by mouth 2 times daily for 10 days  Dispense: 20 tablet; Refill: 0    Recommend healthy diet and adequate water intake  Monitor for increase in symptoms  Call with any further questions/concerns    Plan:  Return if symptoms worsen or fail to improve. An  electronic signature was used to authenticate this note. Communication:  Items for Patient/Writer/Staff to [address] [place] [address], APRN - NP on 4/19/2020 at 4:57 PM19}    This is a telehealth visit that was performed with the originating site at Patient Location: [place]  and Provider Location of [place], [place]. Verbal consent to participate in video visit was obtained. Pursuant to the emergency declaration under the [facility], [facility]5 waiver authority and the [employer] Act, this Virtual Visit was conducted, with patient's consent, to reduce the patient's risk of exposure to COVID-19 and provide continuity of care for an established/new patient. Services were provided through a video synchronous discussion virtually to substitute for in-person clinic visit.  I discussed with the patient the nature of our telehealth visits via interactive/real-time audio/video that:  - I would evaluate the patient and recommend diagnostics and treatments based on my assessment  - Our sessions are not being recorded and that personal health information is protected  - Our team would provide follow up care in person if/when the patient needs it. Services were provided through a video synchronous discussion virtually to substitute for in-person clinic visit.

## 2020-04-14 NOTE — TELEPHONE ENCOUNTER
Pharmacy requesting refill of Tizanidine   4 mg.       Medication active on med list yes      Date of last fill: 11/4/2019  verified on 4/14/2020  verified by Guille Auguste      Date of last appointment 2/3/2020    Next Visit Date:  8/3/2020      Pt notified response time for refills is 24-48 hours

## 2020-04-19 ASSESSMENT — ENCOUNTER SYMPTOMS
SHORTNESS OF BREATH: 0
RHINORRHEA: 1
NAUSEA: 0
COUGH: 0
DIARRHEA: 0
CONSTIPATION: 0
SINUS PRESSURE: 1
VOMITING: 0
SORE THROAT: 0

## 2020-07-20 RX ORDER — OMEPRAZOLE 20 MG/1
20 CAPSULE, DELAYED RELEASE ORAL DAILY
Qty: 90 CAPSULE | Refills: 1 | Status: SHIPPED | OUTPATIENT
Start: 2020-07-20 | End: 2021-01-22

## 2020-07-20 NOTE — TELEPHONE ENCOUNTER
Last visit: 4-14-20  Last Med refill: 1-21-20  Does patient have enough medication for 72 hours: No:     Next Visit Date:  Future Appointments   Date Time Provider Martine Mendez   8/17/2020 11:00 AM SHIVA Mccabe - CNP Neuro [facility]   Topic Date Due    Pneumococcal 0-64 years Vaccine (1 of 1 - PPSV23) 07/23/1963    DTaP/Tdap/Td vaccine (1 - Tdap) 07/23/1976    Breast cancer screen  07/23/2007    Colon Cancer Screen FIT/FOBT  01/22/2020    Cervical cancer screen  04/14/2021 (Originally 12/19/2019)    Shingles Vaccine (1 of 2) 04/14/2021 (Originally 7/23/2007)    Flu vaccine (1) 09/01/2020    Lipid screen  01/22/2024    Hepatitis C screen  Completed    HIV screen  Completed    Hepatitis A vaccine  Aged Out    Hepatitis B vaccine  Aged Out    Hib vaccine  Aged Out    Meningococcal (ACWY) vaccine  Aged Out       Hemoglobin A1C (%)   Date Value   01/22/2019 5.2             ( goal A1C is < 7)   No results found for: LABMICR  LDL Cholesterol (mg/dL)   Date Value   01/22/2019 119   09/23/2015 137 (H)       (goal LDL is <100)   AST (U/L)   Date Value   01/22/2019 20     ALT (U/L)   Date Value   01/22/2019 <5 (L)     BUN (mg/dL)   Date Value   01/22/2019 20     BP Readings from Last 3 Encounters:   02/03/20 128/82   12/19/19 126/80   11/04/19 (!) 138/91          (goal 120/80)    All Future Testing planned in [facility]  Lab Frequency Next Occurrence               Patient Active Problem List:     Parkinson's disease ([facility] [address]     Seasonal allergic rhinitis due to pollen     Asthma     Gastroesophageal reflux disease     Neck pain

## 2020-08-11 RX ORDER — MONTELUKAST SODIUM 10 MG/1
10 TABLET ORAL NIGHTLY
Qty: 90 TABLET | Refills: 1 | Status: SHIPPED | OUTPATIENT
Start: 2020-08-11 | End: 2021-02-15

## 2020-08-11 NOTE — TELEPHONE ENCOUNTER
Caroline Request for medication.     Last Visit Date: 4/14/2020  Next Visit Date:  Future Appointments   Date Time Provider Martine Mendez   8/17/2020 11:00 AM SHIVA More CNP Neuro [facility] Maintenance   Topic Date Due    Pneumococcal 0-64 years Vaccine (1 of 1 - PPSV23) 07[birth year]/1963    DTaP/Tdap/Td vaccine (1 - Tdap) 07/23/1976    Breast cancer screen  07/23/2007    Colon Cancer Screen FIT/FOBT  01/22/2020    Cervical cancer screen  04/14/2021 (Originally 12/19/2019)    Shingles Vaccine (1 of 2) 04/14/2021 (Originally 7/23/2007)    Flu vaccine (1) 09/01/2020    Lipid screen  01/22/2024    Hepatitis C screen  Completed    HIV screen  Completed    Hepatitis A vaccine  Aged Out    Hepatitis B vaccine  Aged Out    Hib vaccine  Aged Out    Meningococcal (ACWY) vaccine  Aged Out       Hemoglobin A1C (%)   Date Value   01/22/2019 5.2             ( goal A1C is < 7)   No results found for: LABMICR  LDL Cholesterol (mg/dL)   Date Value   01/22/2019 119       (goal LDL is <100)   AST (U/L)   Date Value   01/22/2019 20     ALT (U/L)   Date Value   01/22/2019 <5 (L)     BUN (mg/dL)   Date Value   01/22/2019 20     BP Readings from Last 3 Encounters:   02/03/20 128/82   12/19/19 126/80   11/04/19 (!) 138/91          (goal 120/80)    All Future Testing planned in [facility]  Lab Frequency Next Occurrence       Next Visit Date:  Future Appointments   Date Time Provider Martine Mendez   8/17/2020 11:00 AM SHIVA More CNP Neuro [facility]         Patient Active Problem List:     Parkinson's disease ([facility] [address]     Seasonal allergic rhinitis due to pollen     Asthma     Gastroesophageal reflux disease     Neck pain

## 2020-09-16 NOTE — PROGRESS NOTES
[facility]            [address]          [place], [address] [facility]          Dept: [phone]          Dept Fax: [phone]        MD Nancy Mann MD Ahmed B. Leafy Barnes, MD Leretha Conte, MD Mikle Ouch, MD Curly [place], CNP            9/17/2020      HISTORY OF PRESENT ILLNESS:       I had the pleasure of seeing Tanya Hall, who returns for continuing neurologic care. Patient is a 61-year-old woman who was seen last on February 3, 2020 for management of Parkinson's disease. Patient was diagnosed in 2012 when she began having bilateral hand tremor. MRI of the brain in 2014 was unremarkable and other laboratory tests including vitamin B12, folate, TSH, ESR, magnesium, phosphorus, liver enzymes, calcium, BUN/creatinine, all were normal.  She did have an elevated rheumatoid factor and SSA along with a positive FRACISCO. She was evaluated by rheumatology in 2015 and found no evidence of autoimmune disease. The patient was initially started on Requip in February 2015 and in June 2015, Sinemet was added. She was seen by a previous neurologist who had been treating her at the time. Because of persistent tremor predominantly in her right hand as well as bradykinesia, the patient had independently increased her Sinemet 25/100 mg to 3 tablets 3 times daily on her own. She did self find relief with this dosage, but she was changed to Sinemet 25/250 times daily in February 2020. She also increased her Requip dose to 3 mg 3 times daily, also independently. The patient had been complaining of bilateral neck pain is along with cramping mainly in her left foot and an MRI of her cervical spine was completed showing no significant abnormality. She had also noticed that the toes of her left foot were curling under.   She has some improvement today on her neck discomfort, and is now complaining of significant pain in her left hip. It is been present for at least 4-5 months. The pain is a 6/10 most of the time in her left hip and can escalate to a 10/10 with increased activity. She now has pain in her right foot which is secondary to a plantar fasciitis which she recently was prescribed an insert for her shoe. She denies any significant low back pain and she denies any radiation of the left hip discomfort into her leg. Patient denies any side effects to her Parkinson's disease medications such as dyskinesias with an escalated dosage.     Patient is here today for reevaluation. She was referred to an orthopedic surgeon and he diagnosed her with bursitis of her hip. She did some exercises and she has no further pain in her hips. She does have bilateral pain in her feet. It is a burning type of pain and mainly on the anterior surface of her foot. An EMG was prescribed to determine the etiology of her discomfort, however she was afraid to have the test done so did not complete it. The patient's Parkinson's disease has progressed since her last visit. Her tremor in her right arm has increased significantly and she feels much stiffer than previously. She continues to walk without shuffling her feet or having a stooped posture. She does have cogwheeling present in bilateral upper extremities more so on the right than the left.   Her current medications include Sinemet 25/250 mg 3 times daily, Requip 3 mg 3 times daily, and Sinemet CR 5200 at bedtime.                   PAST MEDICAL HISTORY:         Diagnosis Date    Allergic rhinitis     Asthma     Bursitis     bilateral hips    Cervical radiculopathy 9/17/2020    GERD (gastroesophageal reflux disease)     Headache(784.0)     Narcolepsy     Parkinson disease ([facility] [address]     Tremor         PAST SURGICAL HISTORY:         Procedure Laterality Date    FACIAL RECONSTRUCTION SURGERY  1987        SOCIAL HISTORY:     Social History Socioeconomic History    Marital status:      Spouse name: Not on file    Number of children: Not on file    Years of education: Not on file    Highest education level: Not on file   Occupational History    Not on file   Social Needs    Financial resource strain: Not on file    Food insecurity     Worry: Not on file     Inability: Not on file    Transportation needs     Medical: Not on file     Non-medical: Not on file   Tobacco Use    Smoking status: Passive Smoke Exposure - Never Smoker    Smokeless tobacco: Never Used   Substance and Sexual Activity    Alcohol use: Yes     Alcohol/week: 0.0 standard drinks     Comment: occasionally    Drug use: No    Sexual activity: Yes   Lifestyle    Physical activity     Days per week: Not on file     Minutes per session: Not on file    Stress: Not on file   Relationships    Social connections     Talks on phone: Not on file     Gets together: Not on file     Attends Muslim service: Not on file     Active member of club or organization: Not on file     Attends meetings of clubs or organizations: Not on file     Relationship status: Not on file    Intimate partner violence     Fear of current or ex partner: Not on file     Emotionally abused: Not on file     Physically abused: Not on file     Forced sexual activity: Not on file   Other Topics Concern    Not on file   Social History Narrative    Not on file       CURRENT MEDICATIONS:     Current Outpatient Medications   Medication Sig Dispense Refill    gabapentin (NEURONTIN) 100 MG capsule Take 2 capsules by mouth 2 times daily for 30 days.  360 capsule 2    rOPINIRole (REQUIP) 4 MG tablet Take 1 tablet by mouth 3 times daily 270 tablet 2    montelukast (SINGULAIR) 10 MG tablet take 1 tablet by mouth nightly 90 tablet 1    omeprazole (PRILOSEC) 20 MG delayed release capsule Take 1 capsule by mouth Daily 90 capsule 1    tiZANidine (ZANAFLEX) 4 MG tablet Take 1 tablet by mouth nightly 90 tablet 3    albuterol sulfate HFA (VENTOLIN HFA) 108 (90 Base) MCG/ACT inhaler Inhale 2 puffs into the lungs every 6 hours as needed for Wheezing 1 Inhaler 2    carbidopa-levodopa (SINEMET)  MG per tablet Take 1 tablet by mouth 3 times daily 270 tablet 3    amitriptyline (ELAVIL) 25 MG tablet Take 1 tablet by mouth nightly (Patient taking differently: Take 25 mg by mouth nightly Patient doesn't take it all the time.) 90 tablet 3    carbidopa-levodopa (SINEMET CR)  MG per extended release tablet TAKE 1 TABLET NIGHTLY 90 tablet 0     No current facility-administered medications for this visit. ALLERGIES:   No Known Allergies                              REVIEW OF SYSTEMS                   All items selected indicate a positive finding. Those items not selected are negative. Constitutional [] Weight loss/gain   [] Fatigue  [] Fever/Chills   HEENT [] Hearing Loss  [] Visual Disturbance  [] Tinnitus  [] Eye pain   Respiratory [] Shortness of Breath  [] Cough  [] Snoring   Cardiovascular [] Chest Pain  [] Palpitations  [] Lightheaded   GI [] Constipation  [] Diarrhea  [] Swallowing change  [] Nausea/vomiting    [] Urinary Frequency  [] Urinary Urgency   Musculoskeletal [] Neck pain  [] Back pain  [x] Muscle pain  [] Restless legs   Dermatologic [] Skin changes   Neurologic [] Memory loss/confusion  [] Seizures  [x] Trouble walking or imbalance  [] Dizziness  [] Sleep disturbance  [] Weakness  [x] Numbness  [x] Tremors  [] Speech Difficulty  [] Headaches  [] Light Sensitivity  [] Sound Sensitivity   Endocrinology []Excessive thirst  []Excessive hunger   Psychiatric [] Anxiety/Depression  [] Hallucination   Allergy/immunology []Hives/environmental allergies   Hematologic/lymph [] Abnormal bleeding  [] Abnormal bruising         PHYSICAL EXAMINATION:       Vitals:    09/17/20 1425   BP: (!) 155/96   Pulse: 83   Temp: 97.9 °F (36.6 °C)                                              . on the right than the left   Sensory function Intact to light touch, pinprick, vibration, proprioception on all 4 extremities      Cerebellar Intact fine motor movement. No ataxia or dysmetria on finger to nose or heel to shin testing   + Positive rest tremor more so on the right than the left   Reflex function DTR 2+ on bilateral UE and LE, symmetric. Negative Babinski      Gait                    the patient has erect posture during ambulation. There is no shuffling. There is absence of armswing bilaterally with tremor of her right hand                  ASSESSMENT AND PLAN:           In summary, your patient, Gricelda Mcknight exhibits the following, with associated plan:    1. Parkinson's disease which is advanced since her last visit in February 2020. She has increased tremor and increased stiffness and bradykinesia  1. Continue Sinemet 25/250 mg 1 tablet 3 times daily  2. Increase Requip 4 mg 3 times daily  3. Continue Sinemet CR 50/200 mg 1 tablet at bedtime  4. Referral to physical therapy \"Big program\"  5. Discussed several other options including [facility]. I recommended a second opinion at a movement disorder center. The patient will be referred to Dr. Kristy Eubanks at the [facility]  6. She will be seen back in 3 months for reevaluation  2. Left hip pain, resolved  3. Bilateral foot numbness and tingling, concerning for a possible neuropathy  1. EMG/NCV study of bilateral feet  2. Gabapentin 200 mg twice daily  3.  Continue tizanidine 4 mg as needed at bedtime          Signed: Jerome Martinez CNP      *Please note that portions of this note were completed with a voice recognition program.  Although every effort was made to insure the accuracy of this automated transcription, some errors in transcription may have occurred, occasionally words and are mis-transcribed    Scribe Attestation:   By signing my name below, I, Reyes Hobbs, attest that this documentation has been

## 2020-09-17 ENCOUNTER — OFFICE VISIT (OUTPATIENT)
Dept: NEUROLOGY | Age: 65
End: 2020-09-17
Payer: COMMERCIAL

## 2020-09-17 VITALS
SYSTOLIC BLOOD PRESSURE: 155 MMHG | HEIGHT: 66 IN | BODY MASS INDEX: 30.22 KG/M2 | WEIGHT: 188 LBS | DIASTOLIC BLOOD PRESSURE: 96 MMHG | TEMPERATURE: 97.9 F | HEART RATE: 83 BPM

## 2020-09-17 PROBLEM — M54.12 CERVICAL RADICULOPATHY: Status: ACTIVE | Noted: 2020-09-17

## 2020-09-17 PROCEDURE — 1036F TOBACCO NON-USER: CPT | Performed by: NURSE PRACTITIONER

## 2020-09-17 PROCEDURE — 99214 OFFICE O/P EST MOD 30 MIN: CPT | Performed by: NURSE PRACTITIONER

## 2020-09-17 PROCEDURE — G8427 DOCREV CUR MEDS BY ELIG CLIN: HCPCS | Performed by: NURSE PRACTITIONER

## 2020-09-17 PROCEDURE — G8417 CALC BMI ABV UP PARAM F/U: HCPCS | Performed by: NURSE PRACTITIONER

## 2020-09-17 PROCEDURE — 3017F COLORECTAL CA SCREEN DOC REV: CPT | Performed by: NURSE PRACTITIONER

## 2020-09-17 RX ORDER — ROPINIROLE 4 MG/1
4 TABLET, FILM COATED ORAL 3 TIMES DAILY
Qty: 270 TABLET | Refills: 2 | Status: SHIPPED | OUTPATIENT
Start: 2020-09-17 | End: 2021-01-11 | Stop reason: SDUPTHER

## 2020-09-17 RX ORDER — GABAPENTIN 100 MG/1
200 CAPSULE ORAL 2 TIMES DAILY
Qty: 360 CAPSULE | Refills: 2 | Status: SHIPPED | OUTPATIENT
Start: 2020-09-17 | End: 2020-12-08

## 2020-09-21 ENCOUNTER — TELEPHONE (OUTPATIENT)
Dept: NEUROLOGY | Age: 65
End: 2020-09-21

## 2020-10-12 RX ORDER — AMITRIPTYLINE HYDROCHLORIDE 25 MG/1
TABLET, FILM COATED ORAL
Qty: 90 TABLET | Refills: 3 | Status: SHIPPED | OUTPATIENT
Start: 2020-10-12 | End: 2020-12-08

## 2020-10-12 NOTE — TELEPHONE ENCOUNTER
Pharmacy requesting a  refill of amitriptyline 25mg.       Medication active on med list yes      Date of last prescription 11/11/2019  with 3 refills verified on 10/12/2020    verified by VENKATA CANNON      Date of last appointment 09/17/2020    Next Visit Date:  1/11/2021

## 2020-11-24 ENCOUNTER — OFFICE VISIT (OUTPATIENT)
Dept: PRIMARY CARE CLINIC | Age: 65
End: 2020-11-24
Payer: COMMERCIAL

## 2020-11-24 ENCOUNTER — NURSE TRIAGE (OUTPATIENT)
Dept: OTHER | Facility: CLINIC | Age: 65
End: 2020-11-24

## 2020-11-24 VITALS
OXYGEN SATURATION: 96 % | HEIGHT: 66 IN | HEART RATE: 85 BPM | RESPIRATION RATE: 16 BRPM | DIASTOLIC BLOOD PRESSURE: 82 MMHG | WEIGHT: 192.2 LBS | SYSTOLIC BLOOD PRESSURE: 130 MMHG | BODY MASS INDEX: 30.89 KG/M2

## 2020-11-24 PROCEDURE — 3017F COLORECTAL CA SCREEN DOC REV: CPT | Performed by: PHYSICIAN ASSISTANT

## 2020-11-24 PROCEDURE — G8417 CALC BMI ABV UP PARAM F/U: HCPCS | Performed by: PHYSICIAN ASSISTANT

## 2020-11-24 PROCEDURE — G8427 DOCREV CUR MEDS BY ELIG CLIN: HCPCS | Performed by: PHYSICIAN ASSISTANT

## 2020-11-24 PROCEDURE — 99214 OFFICE O/P EST MOD 30 MIN: CPT | Performed by: PHYSICIAN ASSISTANT

## 2020-11-24 PROCEDURE — G8484 FLU IMMUNIZE NO ADMIN: HCPCS | Performed by: PHYSICIAN ASSISTANT

## 2020-11-24 PROCEDURE — 1036F TOBACCO NON-USER: CPT | Performed by: PHYSICIAN ASSISTANT

## 2020-11-24 RX ORDER — DICYCLOMINE HYDROCHLORIDE 10 MG/1
10 CAPSULE ORAL 4 TIMES DAILY
Qty: 120 CAPSULE | Refills: 3 | Status: SHIPPED | OUTPATIENT
Start: 2020-11-24 | End: 2020-12-08

## 2020-11-24 ASSESSMENT — ENCOUNTER SYMPTOMS
CONSTIPATION: 0
VOMITING: 0
SINUS PAIN: 0
RHINORRHEA: 0
COUGH: 0
BELCHING: 0
SHORTNESS OF BREATH: 0
BACK PAIN: 0
ABDOMINAL PAIN: 1
BLOOD IN STOOL: 0
DIARRHEA: 0
NAUSEA: 0

## 2020-11-24 NOTE — TELEPHONE ENCOUNTER
Reason for Disposition   Patient wants to be seen    Answer Assessment - Initial Assessment Questions  1. LOCATION and RADIATION: \"Where is the pain located? \"       pain between hip and rib cage - left side   2. QUALITY: \"What does the pain feel like? \"  (e.g., sharp, dull, aching, burning)     dull  3. SEVERITY: \"How bad is the pain? \" \"What does it keep you from doing? \"   (Scale 1-10; or mild, moderate, severe)    -  MILD (1-3): doesn't interfere with normal activities     -  MODERATE (4-7): interferes with normal activities (e.g., work or school) or awakens from sleep, limping     -  SEVERE (8-10): excruciating pain, unable to do any normal activities, unable to walk      4/10  4. ONSET: \"When did the pain start? \" \"Does it come and go, or is it there all the time? \"      1 month ago  5. WORK OR EXERCISE: \"Has there been any recent work or exercise that involved this part of the body? \"       no  6. CAUSE: \"What do you think is causing the hip pain? \"       unknown  7. AGGRAVATING FACTORS: \"What makes the hip pain worse? \" (e.g., walking, climbing stairs, running)      no  8. OTHER SYMPTOMS: \"Do you have any other symptoms? \" (e.g., back pain, pain shooting down leg,  fever, rash)      no    Protocols used: HIP PAIN-ADULT-[place]    Patient called pre-service center [facility] with red flag complaint. Brief description of triage: pt reports pain in left hip area between hip and rib cage for > 1 month. Rates the pain a 5/10 and dull. PT in concerned     Triage indicates for patient to see pcp in 48 hours    Care advice provided, patient verbalizes understanding; denies any other questions or concerns; instructed to call back for any new or worsening symptoms. Writer provided warm transfer to Garcia at [facility] for appointment scheduling. Attention Provider: Thank you for allowing me to participate in the care of your patient. The patient was connected to triage in response to information provided to the [facility]. Please do not respond through this encounter as the response is not directed to a shared pool.

## 2020-11-24 NOTE — PROGRESS NOTES
[facility]  [address]   [address]  [address] [address]  Dept: [phone]  Dept Fax: [phone]    Rajesh Blanc is a 63 y.o. female who presents today for her medical conditions/complaints as noted below. Chief Complaint   Patient presents with    Abdominal Pain     lt lower side, x 1 month on and off, increased in the last week, feels like there is pressure, has used Motrin for pain       HPI:     Patient presents to the office for evaluation of abdominal pain. This has been intermittent for the past month. The pain has been noticed more so this week. The pain does not radiate to back or groin. She has tried Motrin with no relief. She is tolerating food and drink without difficulty. She is having normal bowel movements. No history of abdominal surgery. She cannot identify any changes in diet. She did start Gabapentin in September. Abdominal Pain   This is a new problem. The current episode started 1 to 4 weeks ago. The onset quality is sudden. The problem occurs intermittently. The problem has been waxing and waning. The pain is located in the generalized abdominal region and LUQ. The pain is at a severity of 4/10. The pain is moderate. The quality of the pain is aching and dull. The abdominal pain does not radiate. Pertinent negatives include no anorexia, arthralgias, belching, constipation, diarrhea, dysuria, fever, frequency, headaches, hematuria, myalgias, nausea or vomiting. Nothing aggravates the pain. The pain is relieved by nothing. She has tried acetaminophen for the symptoms. The treatment provided mild relief. Her past medical history is significant for GERD.        Hemoglobin A1C (%)   Date Value   01/22/2019 5.2             ( goal A1C is < 7)   No results found for: LABMICR  LDL Cholesterol (mg/dL)   Date Value   01/22/2019 119   09/23/2015 137 (H)   11/21/2014 128       (goal LDL is <100)   AST (U/L)   Date Value 01/22/2019 20     ALT (U/L)   Date Value   01/22/2019 <5 (L)     BUN (mg/dL)   Date Value   01/22/2019 20     BP Readings from Last 3 Encounters:   11/24/20 130/82   09/17/20 (!) 155/96   02/03/20 128/82          (goal 120/80)    Past Medical History:   Diagnosis Date    Allergic rhinitis     Asthma     Bursitis     bilateral hips    Cervical radiculopathy 9/17/2020    GERD (gastroesophageal reflux disease)     Headache(784.0)     Narcolepsy     Parkinson disease (HCC)     Tremor       Past Surgical History:   Procedure Laterality Date    FACIAL RECONSTRUCTION SURGERY  1987       Family History   Problem Relation Age of Onset    Heart Disease Father         d/t scarlet fever    Diabetes Sister     Asthma Sister     Asthma Brother        Social History     Tobacco Use    Smoking status: Passive Smoke Exposure - Never Smoker    Smokeless tobacco: Never Used   Substance Use Topics    Alcohol use:  Yes     Alcohol/week: 0.0 standard drinks     Comment: occasionally      Current Outpatient Medications   Medication Sig Dispense Refill    dicyclomine (BENTYL) 10 MG capsule Take 1 capsule by mouth 4 times daily 120 capsule 3    rOPINIRole (REQUIP) 4 MG tablet Take 1 tablet by mouth 3 times daily 270 tablet 2    montelukast (SINGULAIR) 10 MG tablet take 1 tablet by mouth nightly 90 tablet 1    omeprazole (PRILOSEC) 20 MG delayed release capsule Take 1 capsule by mouth Daily 90 capsule 1    tiZANidine (ZANAFLEX) 4 MG tablet Take 1 tablet by mouth nightly 90 tablet 3    albuterol sulfate HFA (VENTOLIN HFA) 108 (90 Base) MCG/ACT inhaler Inhale 2 puffs into the lungs every 6 hours as needed for Wheezing 1 Inhaler 2    carbidopa-levodopa (SINEMET)  MG per tablet Take 1 tablet by mouth 3 times daily 270 tablet 3    carbidopa-levodopa (SINEMET CR)  MG per extended release tablet TAKE 1 TABLET NIGHTLY 90 tablet 0    amitriptyline (ELAVIL) 25 MG tablet TAKE 1 TABLET NIGHTLY 90 tablet 3    gabapentin (NEURONTIN) 100 MG capsule Take 2 capsules by mouth 2 times daily for 30 days. 360 capsule 2     No current facility-administered medications for this visit. No Known Allergies    Health Maintenance   Topic Date Due    DTaP/Tdap/Td vaccine (1 - Tdap) 07/23/1976    Breast cancer screen  07/23/2007    Colon Cancer Screen FIT/FOBT  01/22/2020    Flu vaccine (1) 09/01/2020    Cervical cancer screen  04/14/2021 (Originally 12/19/2019)    Shingles Vaccine (1 of 2) 04/14/2021 (Originally 7/23/2007)    Lipid screen  01/22/2024    Hepatitis C screen  Completed    HIV screen  Completed    Hepatitis A vaccine  Aged Out    Hepatitis B vaccine  Aged Out    Hib vaccine  Aged Out    Meningococcal (ACWY) vaccine  Aged Out    Pneumococcal 0-64 years Vaccine  Aged Out       Subjective:      Review of Systems   Constitutional: Negative for chills, fatigue and fever. HENT: Negative for congestion, rhinorrhea and sinus pain. Respiratory: Negative for cough and shortness of breath. Cardiovascular: Negative for chest pain and leg swelling. Gastrointestinal: Positive for abdominal pain (see HPI). Negative for anorexia, blood in stool, constipation, diarrhea, nausea and vomiting. Genitourinary: Negative for difficulty urinating, dysuria, frequency, hematuria and urgency. Musculoskeletal: Negative for arthralgias, back pain and myalgias. Neurological: Negative for dizziness and headaches. Psychiatric/Behavioral: Negative for confusion, dysphoric mood and sleep disturbance. The patient is not nervous/anxious. All other systems reviewed and are negative. Objective:     Physical Exam  Vitals signs and nursing note reviewed. Constitutional:       General: She is not in acute distress. Appearance: Normal appearance. She is obese. She is not ill-appearing or diaphoretic. HENT:      Head: Normocephalic.       Right Ear: External ear normal.      Left Ear: External ear normal. Mouth/Throat:      Mouth: Mucous membranes are moist.   Eyes:      Extraocular Movements: Extraocular movements intact. Conjunctiva/sclera: Conjunctivae normal.      Pupils: Pupils are equal, round, and reactive to light. Neck:      Musculoskeletal: Normal range of motion. Cardiovascular:      Rate and Rhythm: Normal rate and regular rhythm. Pulses: Normal pulses. Heart sounds: Normal heart sounds. No murmur. Pulmonary:      Effort: Pulmonary effort is normal. No respiratory distress. Breath sounds: Normal breath sounds. Abdominal:      General: Abdomen is flat. Bowel sounds are normal. There is no distension. Palpations: Abdomen is soft. Tenderness: There is generalized abdominal tenderness. There is no right CVA tenderness, left CVA tenderness, guarding or rebound. Negative signs include Almendarez's sign and McBurney's sign. Hernia: No hernia is present. Musculoskeletal:      Right lower leg: No edema. Left lower leg: No edema. Lymphadenopathy:      Cervical: No cervical adenopathy. Skin:     General: Skin is warm. Capillary Refill: Capillary refill takes less than 2 seconds. Neurological:      General: No focal deficit present. Mental Status: She is alert and oriented to person, place, and time. Psychiatric:         Mood and Affect: Mood normal.         Behavior: Behavior normal.       /82 (Site: Left Upper Arm, Position: Sitting, Cuff Size: Medium Adult)   Pulse 85   Resp 16   Ht 5' 6\" (1.676 m)   Wt 192 lb 3.2 oz (87.2 kg)   LMP 02/20/2010   SpO2 96%   Breastfeeding No   BMI 31.02 kg/m²     Assessment:       ICD-10-CM    1. Generalized abdominal pain  R10.84 dicyclomine (BENTYL) 10 MG capsule            Plan:       1. Patient has nonspecific abdominal pain/cramping. She does not endorse any other other symptoms to indicate infection. She was given Rx of Bentyl to use prn.  I advised her to stick with a light diet the next few days and clear fluids. I recommended she follow-up with PCP if symptoms persist or ED if she develops fevers, vomiting, diarrhea, radiating pain to back. Return if symptoms worsen or fail to improve. No orders of the defined types were placed in this encounter. Patient given educational materials - see patient instructions. Discussed use, benefit, and side effects of prescribedmedications. All patient questions answered. Pt voiced understanding. Reviewed health maintenance. Instructed to continue current medications, diet and exercise. Patient agreed with treatment plan. Follow up as directed.         Electronically signed by Grace Manuel PA-C on 11/24/2020 at 3:52 PM.

## 2020-12-08 ENCOUNTER — NURSE TRIAGE (OUTPATIENT)
Dept: OTHER | Facility: CLINIC | Age: 65
End: 2020-12-08

## 2020-12-08 ENCOUNTER — OFFICE VISIT (OUTPATIENT)
Dept: FAMILY MEDICINE CLINIC | Age: 65
End: 2020-12-08
Payer: COMMERCIAL

## 2020-12-08 VITALS
HEART RATE: 85 BPM | BODY MASS INDEX: 30.86 KG/M2 | TEMPERATURE: 97.2 F | SYSTOLIC BLOOD PRESSURE: 160 MMHG | RESPIRATION RATE: 20 BRPM | DIASTOLIC BLOOD PRESSURE: 84 MMHG | OXYGEN SATURATION: 97 % | HEIGHT: 66 IN | WEIGHT: 192 LBS

## 2020-12-08 PROCEDURE — G8417 CALC BMI ABV UP PARAM F/U: HCPCS | Performed by: STUDENT IN AN ORGANIZED HEALTH CARE EDUCATION/TRAINING PROGRAM

## 2020-12-08 PROCEDURE — 1036F TOBACCO NON-USER: CPT | Performed by: STUDENT IN AN ORGANIZED HEALTH CARE EDUCATION/TRAINING PROGRAM

## 2020-12-08 PROCEDURE — 99213 OFFICE O/P EST LOW 20 MIN: CPT | Performed by: STUDENT IN AN ORGANIZED HEALTH CARE EDUCATION/TRAINING PROGRAM

## 2020-12-08 PROCEDURE — G8427 DOCREV CUR MEDS BY ELIG CLIN: HCPCS | Performed by: STUDENT IN AN ORGANIZED HEALTH CARE EDUCATION/TRAINING PROGRAM

## 2020-12-08 PROCEDURE — 3017F COLORECTAL CA SCREEN DOC REV: CPT | Performed by: STUDENT IN AN ORGANIZED HEALTH CARE EDUCATION/TRAINING PROGRAM

## 2020-12-08 PROCEDURE — G8484 FLU IMMUNIZE NO ADMIN: HCPCS | Performed by: STUDENT IN AN ORGANIZED HEALTH CARE EDUCATION/TRAINING PROGRAM

## 2020-12-08 ASSESSMENT — ENCOUNTER SYMPTOMS
CONSTIPATION: 0
SORE THROAT: 0
WHEEZING: 0
DIARRHEA: 0
ABDOMINAL DISTENTION: 1
SHORTNESS OF BREATH: 0
CHEST TIGHTNESS: 0
ABDOMINAL PAIN: 1
COUGH: 0
BACK PAIN: 0

## 2020-12-08 NOTE — PROGRESS NOTES
@[facility]@      12/8/2020      Jose Shepherd is a 63 y.o. female here for the following evaluation regarding the following medical concerns:    HPI:  71-year-old female with Parkinson disease greater than 8 years duration follows with neurology presents for left lower quadrant abdominal pain which has been persistent since November 24 at that time she sought care at a walk-in clinic she was diagnosed as having generalized abdominal pain given a prescription for Bentyl told she was constipated or had diarrhea but did not take the medication because she was peeves at the physician assistant who took care of her. Pain is described as constant radiates to the back    Bowel movements regular no constipation no diarrhea no blood in stool    Review of Systems   Constitutional: Negative for chills, fatigue and fever. HENT: Negative for congestion, postnasal drip and sore throat. Eyes: Negative for visual disturbance. Respiratory: Negative for cough, chest tightness, shortness of breath and wheezing. Cardiovascular: Negative. Gastrointestinal: Positive for abdominal distention and abdominal pain. Negative for constipation and diarrhea. Genitourinary: Negative for difficulty urinating, dysuria, frequency and urgency. Musculoskeletal: Negative for arthralgias, back pain and joint swelling. Skin: Negative for rash. Neurological: Negative for dizziness, weakness and light-headedness. Psychiatric/Behavioral: Negative for agitation, decreased concentration and sleep disturbance. Physical Exam  Abdominal:      General: Abdomen is flat. There is distension. Palpations: Abdomen is soft. There is no hepatomegaly, splenomegaly or mass. Tenderness: There is abdominal tenderness in the left lower quadrant. Prior to Visit Medications    Medication Sig Taking?  Authorizing Provider   rOPINIRole (REQUIP) 4 MG tablet Take 1 tablet by mouth 3 times daily Yes SHIVA Segura - CNP   montelukast (SINGULAIR) 10 MG tablet take 1 tablet by mouth nightly Yes Danyell Bragg, APRN - CNP   omeprazole (PRILOSEC) 20 MG delayed release capsule Take 1 capsule by mouth Daily Yes Danyell Bragg, APRN - CNP   tiZANidine (ZANAFLEX) 4 MG tablet Take 1 tablet by mouth nightly Yes Makenna Barros APRN - CNP   albuterol sulfate HFA (VENTOLIN HFA) 108 (90 Base) MCG/ACT inhaler Inhale 2 puffs into the lungs every 6 hours as needed for Wheezing Yes Danyell Bragg, SHIVA - CNP   carbidopa-levodopa (SINEMET)  MG per tablet Take 1 tablet by mouth 3 times daily Yes SHIVA Ramirez - CNP   carbidopa-levodopa (SINEMET CR)  MG per extended release tablet TAKE 1 TABLET NIGHTLY Yes SHIVA Ramirez CNP        Social History     Tobacco Use    Smoking status: Passive Smoke Exposure - Never Smoker    Smokeless tobacco: Never Used   Substance Use Topics    Alcohol use: Yes     Alcohol/week: 0.0 standard drinks     Comment: occasionally       Body mass index is 30.99 kg/m². Vitals:    12/08/20 1515 12/08/20 1518   BP: (!) 154/90 (!) 160/84   Site: Right Upper Arm Left Upper Arm   Position: Sitting Sitting   Cuff Size: Large Adult Large Adult   Pulse: 85    Resp: 20    Temp: 97.2 °F (36.2 °C)    TempSrc: Temporal    SpO2: 97%    Weight: 192 lb (87.1 kg)    Height: 5' 6\" (1.676 m)         Artur Stauffer was seen today for abdominal pain. Diagnoses and all orders for this visit:    Left lower quadrant abdominal pain  -     CT ABDOMEN PELVIS W IV CONTRAST Additional Contrast? Oral; Future      Pain is vague left lower quadrant there is some point tenderness  Differential diagnoses include bowel obstruction versus diverticulitis versus musculoskeletal less likely.     Need to order CT scan to further delineate  She can keep trying Bentyl in the meantime as she did not try it after being prescribed it on November 24    Has instructions to call office or go to ED if symptoms worsen/persist    Maxim Tristen Hahn MD

## 2020-12-21 NOTE — TELEPHONE ENCOUNTER
Patient was seen 12/08/2020 By Dr Rose Marie Jimenez MD
was your last menstrual period? \"          No    Protocols used: ABDOMINAL PAIN - FEMALE-ADULT-[place]    Caller provided care advice and instructed to call back with worsening symptoms. Attention Provider: Thank you for allowing me to participate in the care of your patient. The patient was connected to triage in response to information provided to the [facility]. Please do not respond through this encounter as the response is not directed to a shared pool. Warm transfer to [place] at [facility].

## 2021-01-11 ENCOUNTER — OFFICE VISIT (OUTPATIENT)
Dept: NEUROLOGY | Age: 66
End: 2021-01-11
Payer: COMMERCIAL

## 2021-01-11 VITALS
BODY MASS INDEX: 31.02 KG/M2 | HEART RATE: 78 BPM | TEMPERATURE: 97 F | SYSTOLIC BLOOD PRESSURE: 123 MMHG | DIASTOLIC BLOOD PRESSURE: 80 MMHG | HEIGHT: 66 IN | WEIGHT: 193 LBS

## 2021-01-11 DIAGNOSIS — G20 PARKINSON'S DISEASE (HCC): ICD-10-CM

## 2021-01-11 DIAGNOSIS — G62.9 NEUROPATHY: ICD-10-CM

## 2021-01-11 PROCEDURE — 99214 OFFICE O/P EST MOD 30 MIN: CPT | Performed by: NURSE PRACTITIONER

## 2021-01-11 PROCEDURE — G8427 DOCREV CUR MEDS BY ELIG CLIN: HCPCS | Performed by: NURSE PRACTITIONER

## 2021-01-11 PROCEDURE — G8417 CALC BMI ABV UP PARAM F/U: HCPCS | Performed by: NURSE PRACTITIONER

## 2021-01-11 PROCEDURE — 3017F COLORECTAL CA SCREEN DOC REV: CPT | Performed by: NURSE PRACTITIONER

## 2021-01-11 PROCEDURE — G8484 FLU IMMUNIZE NO ADMIN: HCPCS | Performed by: NURSE PRACTITIONER

## 2021-01-11 PROCEDURE — 1036F TOBACCO NON-USER: CPT | Performed by: NURSE PRACTITIONER

## 2021-01-11 RX ORDER — CARBIDOPA AND LEVODOPA 50; 200 MG/1; MG/1
TABLET, EXTENDED RELEASE ORAL
Qty: 90 TABLET | Refills: 3 | Status: SHIPPED | OUTPATIENT
Start: 2021-01-11 | End: 2021-12-20

## 2021-01-11 RX ORDER — TIZANIDINE 4 MG/1
4 TABLET ORAL NIGHTLY
Qty: 90 TABLET | Refills: 3 | Status: SHIPPED | OUTPATIENT
Start: 2021-01-11 | End: 2022-03-11 | Stop reason: SDUPTHER

## 2021-01-11 RX ORDER — CARBIDOPA/LEVODOPA 25MG-250MG
1 TABLET ORAL 3 TIMES DAILY
Qty: 270 TABLET | Refills: 3 | Status: SHIPPED | OUTPATIENT
Start: 2021-01-11

## 2021-01-11 RX ORDER — ROPINIROLE 4 MG/1
4 TABLET, FILM COATED ORAL 3 TIMES DAILY
Qty: 270 TABLET | Refills: 3 | Status: SHIPPED | OUTPATIENT
Start: 2021-01-11 | End: 2022-09-01

## 2021-01-11 NOTE — PROGRESS NOTES
[facility]            [address]          [place], [address]          Dept: [phone]          Dept Fax: [phone]        MD Christianne Dietz MD Ahmed B. Barnet Cable, MD Priscella Beals, MD Brooke Aliment, MD Hobart Sera, CNP            1/11/2021      HISTORY OF PRESENT ILLNESS:       I had the pleasure of seeing Eduardo Jameson, who returns for continuing neurologic care. The patient was seen last on September 17, 2020 for treatment of Parkinson's disease and bilateral foot numbness and tingling. The patient suffers from Parkinson's disease which [facility] At that time she has increased tremor as well as increased stiffness and bradykinesia. She is currently taking Sinemet 25/250 mg TID, Requip 4 mg TID, and Simenet CR 50/200 at bedtime. The patient noted some increased tremor in the right arm. It is a constant occurrence. Her balance is normal. The patient reports she feels a gap time between her medication times where her symptoms breakthrough. She notices increased stiffness in her legs before she takes another dose of her medication. The patient also had bilateral foot numbness and tingling. The patient stopped taking Gabapentin 200 mg BID. She continues to take Tizanidine 4 mg at bedtime PRN. When the patient is having foot discomfort, she feels as if her feet are swollen and her ankles do not want to bend or flex. She does soak her feet which helps temporarily. The patient did not have EMGs or go to the [facility] because of the COVID-19 increase.         Testing reviewed:  MRI Cervical Spine 11/15/2019  Impression   Multilevel degenerative disc disease with uncovertebral and facet hypertrophy   without canal stenosis or foraminal narrowing.             PAST MEDICAL HISTORY:         Diagnosis Date    Allergic rhinitis     Asthma     Bursitis     bilateral hips    Cervical radiculopathy 9/17/2020    GERD (gastroesophageal reflux disease)     Headache(784.0)     Narcolepsy     Parkinson disease (HCC)     Tremor         PAST SURGICAL HISTORY:         Procedure Laterality Date    FACIAL RECONSTRUCTION SURGERY  1987        SOCIAL HISTORY:     Social History     Socioeconomic History    Marital status:      Spouse name: Not on file    Number of children: Not on file    Years of education: Not on file    Highest education level: Not on file   Occupational History    Not on file   Social Needs    Financial resource strain: Not on file    Food insecurity     Worry: Not on file     Inability: Not on file    Transportation needs     Medical: Not on file     Non-medical: Not on file   Tobacco Use    Smoking status: Passive Smoke Exposure - Never Smoker    Smokeless tobacco: Never Used   Substance and Sexual Activity    Alcohol use:  Yes     Alcohol/week: 0.0 standard drinks     Comment: occasionally    Drug use: No    Sexual activity: Yes   Lifestyle    Physical activity     Days per week: Not on file     Minutes per session: Not on file    Stress: Not on file   Relationships    Social connections     Talks on phone: Not on file     Gets together: Not on file     Attends Sabianist service: Not on file     Active member of club or organization: Not on file     Attends meetings of clubs or organizations: Not on file     Relationship status: Not on file    Intimate partner violence     Fear of current or ex partner: Not on file     Emotionally abused: Not on file     Physically abused: Not on file     Forced sexual activity: Not on file   Other Topics Concern    Not on file   Social History Narrative    Not on file       CURRENT MEDICATIONS:     Current Outpatient Medications   Medication Sig Dispense Refill    rOPINIRole (REQUIP) 4 MG tablet Take 1 tablet by mouth 3 times daily 270 tablet 2    montelukast (SINGULAIR) 10 MG tablet take 1 tablet by mouth nightly 90 tablet 1    omeprazole (PRILOSEC) 20 MG delayed release capsule Take 1 capsule by mouth Daily 90 capsule 1    tiZANidine (ZANAFLEX) 4 MG tablet Take 1 tablet by mouth nightly 90 tablet 3    albuterol sulfate HFA (VENTOLIN HFA) 108 (90 Base) MCG/ACT inhaler Inhale 2 puffs into the lungs every 6 hours as needed for Wheezing 1 Inhaler 2    carbidopa-levodopa (SINEMET)  MG per tablet Take 1 tablet by mouth 3 times daily 270 tablet 3    carbidopa-levodopa (SINEMET CR)  MG per extended release tablet TAKE 1 TABLET NIGHTLY 90 tablet 0     No current facility-administered medications for this visit. ALLERGIES:   No Known Allergies                              REVIEW OF SYSTEMS        All items selected indicate a positive finding. Those items not selected are negative.   Constitutional [] Weight loss/gain   [] Fatigue  [] Fever/Chills   HEENT [] Hearing Loss  [] Visual Disturbance  [] Tinnitus  [] Eye pain   Respiratory [] Shortness of Breath  [] Cough  [] Snoring   Cardiovascular [] Chest Pain  [] Palpitations  [] Lightheaded   GI [] Constipation  [] Diarrhea  [] Swallowing change  [] Nausea/vomiting    [] Urinary Frequency  [] Urinary Urgency   Musculoskeletal [] Neck pain  [] Back pain  [x] Muscle pain  [] Restless legs   Dermatologic [] Skin changes   Neurologic [] Memory loss/confusion  [] Seizures  [] Trouble walking or imbalance  [] Dizziness  [] Sleep disturbance  [x] Weakness  [x] Numbness  [x] Tremors  [] Speech Difficulty  [] Headaches  [] Light Sensitivity  [] Sound Sensitivity   Endocrinology []Excessive thirst  []Excessive hunger   Psychiatric [] Anxiety/Depression  [] Hallucination   Allergy/immunology []Hives/environmental allergies   Hematologic/lymph [] Abnormal bleeding  [] Abnormal bruising         PHYSICAL EXAMINATION:       Vitals:    01/11/21 1043   BP: 123/80   Pulse: 78   Temp: 97 °F (36.1 °C) .                                                                                                    General Appearance:  Alert, cooperative, no signs of distress, appears stated age   Head:  Normocephalic, no signs of trauma   Eyes:  Conjunctiva/corneas clear;  eyelids intact   Ears:  Normal external ear and canals   Nose: Nares normal, mucosa normal, no drainage    Throat: Lips and tongue normal; teeth normal;  gums normal   Neck: Supple, intact flexion, extension and rotation;   trachea midline;  no adenopathy;   thyroid: not enlarged;   no carotid pulse abnormality   Back:   Symmetric, no curvature, ROM adequate   Lungs:   Respirations unlabored   Heart:  Regular rate and rhythm           Extremities: Extremities normal, no cyanosis, no edema   Pulses: Symmetric over head and neck   Skin: Skin color, texture normal, no rashes, no lesions                                     NEUROLOGIC EXAMINATION    Neurologic Exam  Mental status    Alert and oriented x 3; intact memory with no confusion, speech or language problems; no hallucinations or delusions  Fund of information appropriate for level of education    Cranial nerves    II - visual fields intact to confrontation bilaterally  III, IV, VI - extra-ocular muscles full: no pupillary defect; no EDWIGE, no nystagmus, no ptosis   V - normal facial sensation                                                               VII - normal facial symmetry                                                             VIII - intact hearing                                                                             IX, X - symmetrical palate                                                                  XI - symmetrical shoulder shrug                                                       XII - tongue midline without atrophy or fasciculation      Motor function  Normal muscle bulk and tone; strength 5/5 on all 4 extremities, no pronator drift. Some cog-wheeling on the right upper extremity. Sensory function Intact to light touch, pinprick, vibration, proprioception on all 4 extremities      Cerebellar Intact fine motor movement. No involuntary movements or tremors. No ataxia or dysmetria on finger to nose or heel to shin testing      Reflex function DTR 2+ on bilateral UE and LE, symmetric. Negative Babinski      Gait                   normal base and arm swing                  Medical Decision Making: In summary, your patient, Otoniel Mclean exhibits the following, with associated plan:    1. Parkinson's disease which is advanced since her last visit in February 2020. She has increased tremor and increased stiffness and bradykinesia  1. Continue Sinemet 25/250 mg 1 tablet 3 times daily  2. Continue Requip 4 mg 3 times daily  3. Continue Sinemet CR 50/200 mg 1 tablet at bedtime    4. Discussed several other options including Comtan or Stalevo. I recommended a second opinion at a movement disorder center. The patient will be referred to Dr. Zenaida Sapp at the [facility]  5. She will be seen back in 3 months for reevaluation  2. Left hip pain, resolved  3. Bilateral foot numbness and tingling, concerning for a possible neuropathy  1. Obtain EMG/NCV study of bilateral feet  2. Decrease Gabapentin 100 mg twice daily  3. Continue Tizanidine 4 mg as needed at bedtime        Signed: Italia Al CNP      *Please note that portions of this note were completed with a voice recognition program.  Although every effort was made to insure the accuracy of this automated transcription, some errors in transcription may have occurred, occasionally words and are mis-transcribed    Provider Attestation: The documentation recorded by the scribe accurately reflects the service I personally performed and the decisions made by myself. Portions of this note were transcribed by a scribe.  I personally performed the history, physical exam, and medical decision-making and confirm the accuracy of the information in the transcribed note. Scribe Attestation:   By signing my name below, Skeet Pac, attest that this documentation has been prepared under the direction and in the presence of Lucy Hart CNP.

## 2021-01-22 DIAGNOSIS — K21.9 GASTROESOPHAGEAL REFLUX DISEASE: ICD-10-CM

## 2021-01-22 RX ORDER — OMEPRAZOLE 20 MG/1
CAPSULE, DELAYED RELEASE ORAL
Qty: 90 CAPSULE | Refills: 3 | Status: SHIPPED | OUTPATIENT
Start: 2021-01-22 | End: 2022-01-10

## 2021-01-22 NOTE — TELEPHONE ENCOUNTER
Last visit: 04/14/20  Last Med refill: 07/20/20      Next Visit Date:  Future Appointments   Date Time Provider Martine Mendez   7/12/2021 10:40 AM SHIVA Patel CNP [facility] Maintenance   Topic Date Due    Pneumococcal 0-64 years Vaccine (1 of 1 - PPSV23) 07/23/1961    DTaP/Tdap/Td vaccine (1 - Tdap) 07/23/1976    Breast cancer screen  07/23/2007    Colon Cancer Screen FIT/FOBT  01/22/2020    Cervical cancer screen  04/14/2021 (Originally 12/19/2019)    Shingles Vaccine (1 of 2) 04/14/2021 (Originally 7/23/2007)    Flu vaccine (1) 12/08/2021 (Originally 9/1/2020)    Lipid screen  01/22/2024    Hepatitis C screen  Completed    HIV screen  Completed    Hepatitis A vaccine  Aged Out    Hepatitis B vaccine  Aged Out    Hib vaccine  Aged Out    Meningococcal (ACWY) vaccine  Aged Out       Hemoglobin A1C (%)   Date Value   01/22/2019 5.2             ( goal A1C is < 7)   No results found for: LABMICR  LDL Cholesterol (mg/dL)   Date Value   01/22/2019 119   09/23/2015 137 (H)       (goal LDL is <100)   AST (U/L)   Date Value   01/22/2019 20     ALT (U/L)   Date Value   01/22/2019 <5 (L)     BUN (mg/dL)   Date Value   01/22/2019 20     BP Readings from Last 3 Encounters:   01/11/21 123/80   12/08/20 (!) 160/84   11/24/20 130/82          (goal 120/80)    All Future Testing planned in [facility]  Lab Frequency Next Occurrence   CT ABDOMEN PELVIS W IV CONTRAST Additional Contrast? Oral Once 12/08/2020               Patient Active Problem List:     Parkinson's disease ([facility] [address]     Seasonal allergic rhinitis due to pollen     Asthma     Gastroesophageal reflux disease     Neck pain     Cervical radiculopathy     Neuropathy

## 2021-02-14 DIAGNOSIS — J30.1 SEASONAL ALLERGIC RHINITIS DUE TO POLLEN: ICD-10-CM

## 2021-02-15 RX ORDER — MONTELUKAST SODIUM 10 MG/1
10 TABLET ORAL NIGHTLY
Qty: 90 TABLET | Refills: 1 | Status: SHIPPED | OUTPATIENT
Start: 2021-02-15 | End: 2021-05-11 | Stop reason: SDUPTHER

## 2021-02-15 NOTE — TELEPHONE ENCOUNTER
Last visit:4/14/20  Last Med refill:8/11/2020  Does patient have enough medication for 72 hours: Yes    Next Visit Date:  Future Appointments   Date Time Provider Martine Mendez   7/12/2021 10:40 AM Todd Carreno APRN - CNP [facility] Maintenance   Topic Date Due    Pneumococcal 0-64 years Vaccine (1 of 1 - PPSV23) [birth year]/1963    DTaP/Tdap/Td vaccine (1 - Tdap) 07/23/1976    Breast cancer screen  07/23/2007    Colon Cancer Screen FIT/FOBT  01/22/2020    Cervical cancer screen  04/14/2021 (Originally 12/19/2019)    Shingles Vaccine (1 of 2) 04/14/2021 (Originally 7/23/2007)    Flu vaccine (1) 12/08/2021 (Originally 9/1/2020)    Lipid screen  01/22/2024    Hepatitis C screen  Completed    HIV screen  Completed    Hepatitis A vaccine  Aged Out    Hepatitis B vaccine  Aged Out    Hib vaccine  Aged Out    Meningococcal (ACWY) vaccine  Aged Out       Hemoglobin A1C (%)   Date Value   01/22/2019 5.2             ( goal A1C is < 7)   No results found for: LABMICR  LDL Cholesterol (mg/dL)   Date Value   01/22/2019 119   09/23/2015 137 (H)       (goal LDL is <100)   AST (U/L)   Date Value   01/22/2019 20     ALT (U/L)   Date Value   01/22/2019 <5 (L)     BUN (mg/dL)   Date Value   01/22/2019 20     BP Readings from Last 3 Encounters:   01/11/21 123/80   12/08/20 (!) 160/84   11/24/20 130/82          (goal 120/80)    All Future Testing planned in [facility]  Lab Frequency Next Occurrence   CT ABDOMEN PELVIS W IV CONTRAST Additional Contrast? Oral Once 12/08/2020               Patient Active Problem List:     Parkinson's disease ([address]     Seasonal allergic rhinitis due to pollen     Asthma     Gastroesophageal reflux disease     Neck pain     Cervical radiculopathy     Neuropathy

## 2021-03-15 ENCOUNTER — PATIENT MESSAGE (OUTPATIENT)
Dept: FAMILY MEDICINE CLINIC | Age: 66
End: 2021-03-15

## 2021-03-19 ENCOUNTER — PATIENT MESSAGE (OUTPATIENT)
Dept: FAMILY MEDICINE CLINIC | Age: 66
End: 2021-03-19

## 2021-05-11 DIAGNOSIS — J30.1 SEASONAL ALLERGIC RHINITIS DUE TO POLLEN: ICD-10-CM

## 2021-05-12 RX ORDER — MONTELUKAST SODIUM 10 MG/1
10 TABLET ORAL NIGHTLY
Qty: 90 TABLET | Refills: 1 | Status: SHIPPED | OUTPATIENT
Start: 2021-05-12 | End: 2021-10-21

## 2021-09-21 ENCOUNTER — OFFICE VISIT (OUTPATIENT)
Dept: NEUROLOGY | Age: 66
End: 2021-09-21
Payer: COMMERCIAL

## 2021-09-21 VITALS
HEART RATE: 91 BPM | HEIGHT: 66 IN | WEIGHT: 180 LBS | SYSTOLIC BLOOD PRESSURE: 138 MMHG | DIASTOLIC BLOOD PRESSURE: 88 MMHG | BODY MASS INDEX: 28.93 KG/M2

## 2021-09-21 DIAGNOSIS — M25.562 LEFT KNEE PAIN, UNSPECIFIED CHRONICITY: ICD-10-CM

## 2021-09-21 DIAGNOSIS — G20 PARKINSON'S DISEASE (HCC): Primary | ICD-10-CM

## 2021-09-21 DIAGNOSIS — G62.9 NEUROPATHY: ICD-10-CM

## 2021-09-21 PROCEDURE — G8427 DOCREV CUR MEDS BY ELIG CLIN: HCPCS | Performed by: NURSE PRACTITIONER

## 2021-09-21 PROCEDURE — 1036F TOBACCO NON-USER: CPT | Performed by: NURSE PRACTITIONER

## 2021-09-21 PROCEDURE — 3017F COLORECTAL CA SCREEN DOC REV: CPT | Performed by: NURSE PRACTITIONER

## 2021-09-21 PROCEDURE — G8417 CALC BMI ABV UP PARAM F/U: HCPCS | Performed by: NURSE PRACTITIONER

## 2021-09-21 PROCEDURE — 99214 OFFICE O/P EST MOD 30 MIN: CPT | Performed by: NURSE PRACTITIONER

## 2021-09-21 NOTE — PROGRESS NOTES
[facility]            [address]          [place], [address] [facility]          Dept: [phone]          Dept Fax: [phone]        MD Ruben Wade MD Letty Ape, MD Kirstie Chad, CNP            9/21/2021      HISTORY OF PRESENT ILLNESS:       I had the pleasure of seeing Ann Marie Valladares, who returns for continuing neurologic care. The patient was seen last on January 11, 2021 for treatment of parkinson's disease, left hip pain and bilateral foot numbness and tingling. For management of her parkinson's disease she is prescribed sinemet 25/250 mg 1 tablet three times daily, tizanidine 4 mg at bedtime daily and sinemet CR 50/200 mg 1 tablet at bedtime. She has not attended physical therapy due to her work schedule during the summer but is now agreeable to attend. She has remained compliant with sinemet and requip and notes no side effects at today's visit. She is agreeable to be referred to Dr. Nadira Sun and the [facility]. She was diagnosed with parkinson's disease in 2012 by Dr. Eulogio Vargas. She notices a fluctuation in the medication efficacy towards the end of the dosage cycle. She denies any significant tremor unless she is increasingly stressed. Patient also has dyskinesias primarily of her left hand and arm during ambulation    For management of her bilateral foot numbness and tingling an EMG of her bilateral lower extremities was ordered at her last visit however it was not completed prior to today's visit.       Testing reviewed:    MRI Cervical Spine 11/15/2019  Impression   Multilevel degenerative disc disease with uncovertebral and facet hypertrophy   without canal stenosis or foraminal narrowing.                   PAST MEDICAL HISTORY:         Diagnosis Date    Allergic rhinitis     Asthma     Bursitis     bilateral hips    Cervical radiculopathy 9/17/2020    GERD (gastroesophageal reflux disease)     Headache(784.0)     Narcolepsy     Parkinson disease (HCC)     Tremor         PAST SURGICAL HISTORY:         Procedure Laterality Date    FACIAL RECONSTRUCTION SURGERY  1987        SOCIAL HISTORY:     Social History     Socioeconomic History    Marital status:      Spouse name: Not on file    Number of children: Not on file    Years of education: Not on file    Highest education level: Not on file   Occupational History    Not on file   Tobacco Use    Smoking status: Passive Smoke Exposure - Never Smoker    Smokeless tobacco: Never Used   Vaping Use    Vaping Use: Never used   Substance and Sexual Activity    Alcohol use: Yes     Alcohol/week: 0.0 standard drinks     Comment: occasionally    Drug use: No    Sexual activity: Yes   Other Topics Concern    Not on file   Social History Narrative    Not on file     Social Determinants of Health     Financial Resource Strain:     Difficulty of Paying Living Expenses:    Food Insecurity:     Worried About Running Out of Food in the Last Year:     [address] in the Last Year:    Transportation Needs:     Lack of Transportation (Medical):      Lack of Transportation (Non-Medical):    Physical Activity:     Days of Exercise per Week:     Minutes of Exercise per Session:    Stress:     Feeling of Stress :    Social Connections:     Frequency of Communication with Friends and Family:     Frequency of Social Gatherings with Friends and Family:     Attends [employer] Services:     Active Member of Clubs or Organizations:     Attends Club or Organization Meetings:     Marital Status:    Intimate Partner Violence:     Fear of Current or Ex-Partner:     Emotionally Abused:     Physically Abused:     Sexually Abused:        CURRENT MEDICATIONS:     Current Outpatient Medications   Medication Sig Dispense Refill    montelukast (SINGULAIR) 10 MG tablet Take 1 tablet by mouth nightly 90 tablet 1    omeprazole (PRILOSEC) 20 MG delayed release capsule TAKE 1 CAPSULE DAILY 90 capsule 3    carbidopa-levodopa (SINEMET CR)  MG per extended release tablet TAKE 1 TABLET NIGHTLY 90 tablet 3    carbidopa-levodopa (SINEMET)  MG per tablet Take 1 tablet by mouth 3 times daily 270 tablet 3    rOPINIRole (REQUIP) 4 MG tablet Take 1 tablet by mouth 3 times daily 270 tablet 3    tiZANidine (ZANAFLEX) 4 MG tablet Take 1 tablet by mouth nightly 90 tablet 3    albuterol sulfate HFA (VENTOLIN HFA) 108 (90 Base) MCG/ACT inhaler Inhale 2 puffs into the lungs every 6 hours as needed for Wheezing 1 Inhaler 2     No current facility-administered medications for this visit. ALLERGIES:   No Known Allergies                              REVIEW OF SYSTEMS        All items selected indicate a positive finding. Those items not selected are negative.   Constitutional [] Weight loss/gain   [] Fatigue  [] Fever/Chills   HEENT [] Hearing Loss  [] Visual Disturbance  [] Tinnitus  [] Eye pain   Respiratory [] Shortness of Breath  [] Cough  [] Snoring   Cardiovascular [] Chest Pain  [] Palpitations  [] Lightheaded   GI [] Constipation  [] Diarrhea  [] Swallowing change  [] Nausea/vomiting    [] Urinary Frequency  [] Urinary Urgency   Musculoskeletal [] Neck pain  [] Back pain  [] Muscle pain  [] Restless legs   Dermatologic [] Skin changes   Neurologic [] Memory loss/confusion  [] Seizures  [x] Trouble walking or imbalance  [] Dizziness  [] Sleep disturbance  [] Weakness  [] Numbness  [] Tremors  [] Speech Difficulty  [] Headaches  [] Light Sensitivity  [] Sound Sensitivity   Endocrinology []Excessive thirst  []Excessive hunger   Psychiatric [] Anxiety/Depression  [] Hallucination   Allergy/immunology []Hives/environmental allergies   Hematologic/lymph [] Abnormal bleeding  [] Abnormal bruising         PHYSICAL EXAMINATION:       Vitals:    09/21/21 0924   BP: 138/88   Pulse: 91 .                                                                                                    General Appearance:  Alert, cooperative, no signs of distress, appears stated age   Head:  Normocephalic, no signs of trauma   Eyes:  Conjunctiva/corneas clear;  eyelids intact   Ears:  Normal external ear and canals   Nose: Nares normal, mucosa normal, no drainage    Throat: Lips and tongue normal; teeth normal;  gums normal   Neck: Supple, intact flexion, extension and rotation;   trachea midline;  no adenopathy;   thyroid: not enlarged;   no carotid pulse abnormality   Back:   Symmetric, no curvature, ROM adequate   Lungs:   Respirations unlabored   Heart:  Regular rate and rhythm           Extremities: Extremities normal, no cyanosis, no edema   Pulses: Symmetric over head and neck   Skin: Skin color, texture normal, no rashes, no lesions                                     NEUROLOGIC EXAMINATION    Neurologic Exam  Mental status    Alert and oriented x 3; intact memory with no confusion, speech or language problems; no hallucinations or delusions  Fund of information appropriate for level of education    Cranial nerves    II - visual fields intact to confrontation bilaterally  III, IV, VI  extra-ocular muscles full: no pupillary defect; no EDWIGE, no nystagmus, no ptosis   V - normal facial sensation                                                               VII - normal facial symmetry                                                             VIII - intact hearing                                                                             IX, X - symmetrical palate                                                                  XI - symmetrical shoulder shrug                                                       XII - tongue midline without atrophy or fasciculation      Motor function  Normal muscle bulk and tone; strength 5/5 on all 4 extremities, no pronator drift, cogwheeling on the right, able to stand from a seated position with arms crossed      Sensory function Intact to light touch, pinprick, vibration, proprioception on all 4 extremities      Cerebellar Intact fine motor movement. No involuntary movements or tremors. No ataxia or dysmetria on finger to nose or heel to shin testing      Reflex function DTR 2+ on bilateral UE and LE, symmetric. Negative Babinski      Gait                  Limited arm swing on the right, dyskinesias on the left                  Medical Decision Making: In summary, your patient, Lois Copeland exhibits the following, with associated plan:    1. Parkinson's disease. Harlan Luna has increased tremor and increased stiffness and bradykinesia. She is also having dyskinesias of her left hand especially during ambulation. 1. Continue Sinemet 25/250 mg 1 tablet 3 times daily  2. Continue Requip 4 mg 3 times daily  3. Continue Sinemet CR 50/200 mg 1 tablet at bedtime  4. Discussed several other options including Comtan or Stalevo.  I recommended a second opinion at a movement disorder center.  The patient will be referred to Dr. Valerie Wayne at the [facility]  5. Referral made to the [facility] for physical therapy  6. She will be seen back in 6 months for reevaluation  2. Left hip pain, resolved  3. She had a recent fall and has been having increased left knee pain prior to today's visit  1. Referral to Dr. Juan Prajapati, orthopedic surgeon  4. Bilateral foot numbness and tingling, concerning for a possible neuropathy  1. Obtain EMG/NCV study of bilateral feet  2.  Continue Tizanidine 4 mg as needed at bedtime            Signed: Gigi Prescott CNP      *Please note that portions of this note were completed with a voice recognition program.  Although every effort was made to insure the accuracy of this automated transcription, some errors in transcription may have occurred, occasionally words and are mis-transcribed    Provider Attestation: The documentation recorded by the scribe accurately reflects the service I personally performed and the decisions made by myself. Portions of this note were transcribed by a scribe. I personally performed the history, physical exam, and medical decision-making and confirm the accuracy of the information in the transcribed note. Scribe Attestation:   By signing my name below, To Shad, attest that this documentation has been prepared under the direction and in the presence of Kiana Cortes CNP.

## 2021-10-14 ENCOUNTER — OFFICE VISIT (OUTPATIENT)
Dept: FAMILY MEDICINE CLINIC | Age: 66
End: 2021-10-14
Payer: COMMERCIAL

## 2021-10-14 VITALS
WEIGHT: 178.8 LBS | HEART RATE: 93 BPM | RESPIRATION RATE: 13 BRPM | SYSTOLIC BLOOD PRESSURE: 136 MMHG | BODY MASS INDEX: 28.73 KG/M2 | TEMPERATURE: 97.2 F | DIASTOLIC BLOOD PRESSURE: 84 MMHG | HEIGHT: 66 IN | OXYGEN SATURATION: 97 %

## 2021-10-14 DIAGNOSIS — Z12.11 COLON CANCER SCREENING: ICD-10-CM

## 2021-10-14 DIAGNOSIS — R10.12 LUQ PAIN: Primary | ICD-10-CM

## 2021-10-14 PROCEDURE — G8417 CALC BMI ABV UP PARAM F/U: HCPCS | Performed by: STUDENT IN AN ORGANIZED HEALTH CARE EDUCATION/TRAINING PROGRAM

## 2021-10-14 PROCEDURE — 1036F TOBACCO NON-USER: CPT | Performed by: STUDENT IN AN ORGANIZED HEALTH CARE EDUCATION/TRAINING PROGRAM

## 2021-10-14 PROCEDURE — 3017F COLORECTAL CA SCREEN DOC REV: CPT | Performed by: STUDENT IN AN ORGANIZED HEALTH CARE EDUCATION/TRAINING PROGRAM

## 2021-10-14 PROCEDURE — G8484 FLU IMMUNIZE NO ADMIN: HCPCS | Performed by: STUDENT IN AN ORGANIZED HEALTH CARE EDUCATION/TRAINING PROGRAM

## 2021-10-14 PROCEDURE — 99213 OFFICE O/P EST LOW 20 MIN: CPT | Performed by: STUDENT IN AN ORGANIZED HEALTH CARE EDUCATION/TRAINING PROGRAM

## 2021-10-14 PROCEDURE — G8427 DOCREV CUR MEDS BY ELIG CLIN: HCPCS | Performed by: STUDENT IN AN ORGANIZED HEALTH CARE EDUCATION/TRAINING PROGRAM

## 2021-10-14 SDOH — ECONOMIC STABILITY: FOOD INSECURITY: WITHIN THE PAST 12 MONTHS, THE FOOD YOU BOUGHT JUST DIDN'T LAST AND YOU DIDN'T HAVE MONEY TO GET MORE.: NEVER TRUE

## 2021-10-14 SDOH — ECONOMIC STABILITY: FOOD INSECURITY: WITHIN THE PAST 12 MONTHS, YOU WORRIED THAT YOUR FOOD WOULD RUN OUT BEFORE YOU GOT MONEY TO BUY MORE.: NEVER TRUE

## 2021-10-14 ASSESSMENT — ENCOUNTER SYMPTOMS
BACK PAIN: 0
DIARRHEA: 0
CHEST TIGHTNESS: 0
WHEEZING: 0
ABDOMINAL PAIN: 0
ABDOMINAL DISTENTION: 0
COUGH: 0
SORE THROAT: 0
CONSTIPATION: 0
SHORTNESS OF BREATH: 0

## 2021-10-14 ASSESSMENT — PATIENT HEALTH QUESTIONNAIRE - PHQ9
SUM OF ALL RESPONSES TO PHQ QUESTIONS 1-9: 0
SUM OF ALL RESPONSES TO PHQ9 QUESTIONS 1 & 2: 0
SUM OF ALL RESPONSES TO PHQ QUESTIONS 1-9: 0
2. FEELING DOWN, DEPRESSED OR HOPELESS: 0
1. LITTLE INTEREST OR PLEASURE IN DOING THINGS: 0
SUM OF ALL RESPONSES TO PHQ QUESTIONS 1-9: 0

## 2021-10-14 ASSESSMENT — SOCIAL DETERMINANTS OF HEALTH (SDOH): HOW HARD IS IT FOR YOU TO PAY FOR THE VERY BASICS LIKE FOOD, HOUSING, MEDICAL CARE, AND HEATING?: NOT HARD AT ALL

## 2021-10-14 NOTE — PROGRESS NOTES
Uriel Hobbs (:  1955) is a 61 y.o. female,Established patient, here for evaluation of the following chief complaint(s):  Abdominal Pain         ASSESSMENT/PLAN:  1. LUQ pain  -     US ABDOMEN LIMITED; Future  -     CBC With Auto Differential; Future  -     Comprehensive Metabolic Panel, Fasting; Future  -     CEA; Future  -     Cancer Antigen 19-9; Future  2. Colon cancer screening  -     Dorinda Aparicio MD, Gastroenterology, [employer]      No follow-ups on file. Subjective   SUBJECTIVE/OBJECTIVE:  HPI LUQ pain for 9 months    No distinct pain pattern    No fevers chills nightsweats    No palpable splenomegaly        Review of Systems   Constitutional: Negative for chills, fatigue and fever. HENT: Negative for congestion, postnasal drip and sore throat. Eyes: Negative for visual disturbance. Respiratory: Negative for cough, chest tightness, shortness of breath and wheezing. Cardiovascular: Negative. Gastrointestinal: Negative for abdominal distention, abdominal pain, constipation and diarrhea. Genitourinary: Negative for difficulty urinating, dysuria, frequency and urgency. Musculoskeletal: Negative for arthralgias, back pain and joint swelling. Skin: Negative for rash. Neurological: Negative for dizziness, weakness and light-headedness. Psychiatric/Behavioral: Negative for agitation, decreased concentration and sleep disturbance. Objective   Physical Exam  Vitals and nursing note reviewed. Constitutional:       Appearance: Normal appearance. HENT:      Head: Normocephalic and atraumatic. Eyes:      Extraocular Movements: Extraocular movements intact. Cardiovascular:      Rate and Rhythm: Normal rate and regular rhythm. Pulses: Normal pulses. Heart sounds: Normal heart sounds. Pulmonary:      Effort: Pulmonary effort is normal.      Breath sounds: Normal breath sounds. Abdominal:      General: Abdomen is flat.       Palpations: Abdomen is soft. Musculoskeletal:         General: Normal range of motion. Cervical back: Normal range of motion and neck supple. Skin:     General: Skin is warm. Neurological:      General: No focal deficit present. Mental Status: She is alert and oriented to person, place, and time. An electronic signature was used to authenticate this note.     --Loretta Guajardo MD

## 2021-10-20 DIAGNOSIS — J30.1 SEASONAL ALLERGIC RHINITIS DUE TO POLLEN: ICD-10-CM

## 2021-10-21 ENCOUNTER — HOSPITAL ENCOUNTER (OUTPATIENT)
Dept: ULTRASOUND IMAGING | Facility: CLINIC | Age: 66
Discharge: HOME | End: 2021-10-23
Payer: COMMERCIAL

## 2021-10-21 DIAGNOSIS — R10.12 LUQ PAIN: ICD-10-CM

## 2021-10-21 PROCEDURE — 76705 ECHO EXAM OF ABDOMEN: CPT

## 2021-10-21 NOTE — TELEPHONE ENCOUNTER
Last visit: 10-  Last Med refill: 05-  Does patient have enough medication for 72 hours: No:     Next Visit Date:  Future Appointments   Date Time Provider Martine Mendez   3/21/2022 10:40 AM SHIVA Beal - CNP Neuro [facility] Maintenance   Topic Date Due    Breast cancer screen  Never done    Cervical cancer screen  12/19/2019    Colon Cancer Screen FIT/FOBT  01/22/2020    COVID-19 Vaccine (3 - Pfizer booster) 09/25/2021    DTaP/Tdap/Td vaccine (1 - Tdap) 10/14/2022 (Originally 7/23/1976)    Flu vaccine (1) 10/14/2022 (Originally 9/1/2021)    Shingles Vaccine (1 of 2) 10/14/2022 (Originally 7/23/2007)    Pneumococcal 0-64 years Vaccine (1 of 2 - PPSV23) 10/14/2022 (Originally 7/23/1963)    Lipid screen  01/22/2024    Hepatitis C screen  Completed    HIV screen  Completed    Hepatitis A vaccine  Aged Out    Hepatitis B vaccine  Aged Out    Hib vaccine  Aged Out    Meningococcal (ACWY) vaccine  Aged Out       Hemoglobin A1C (%)   Date Value   01/22/2019 5.2             ( goal A1C is < 7)   No results found for: LABMICR  LDL Cholesterol (mg/dL)   Date Value   01/22/2019 119   09/23/2015 137 (H)       (goal LDL is <100)   AST (U/L)   Date Value   01/22/2019 20     ALT (U/L)   Date Value   01/22/2019 <5 (L)     BUN (mg/dL)   Date Value   01/22/2019 20     BP Readings from Last 3 Encounters:   10/14/21 136/84   09/21/21 138/88   01/11/21 123/80          (goal 120/80)    All Future Testing planned in [facility]  Lab Frequency Next Occurrence   CT ABDOMEN PELVIS W IV CONTRAST Additional Contrast? Oral Once 12/08/2021   CBC With Auto Differential Once 10/14/2021   Comprehensive Metabolic Panel, Fasting Once 10/14/2021   CEA Once 10/14/2021   Cancer Antigen 19-9 Once 10/14/2021               Patient Active Problem List:     Parkinson's disease ([address]     Seasonal allergic rhinitis due to pollen     Asthma     Gastroesophageal reflux disease     Neck pain     Cervical radiculopathy     Neuropathy

## 2021-10-22 ENCOUNTER — HOSPITAL ENCOUNTER (OUTPATIENT)
Age: 66
Setting detail: SPECIMEN
Discharge: HOME | End: 2021-10-22
Payer: COMMERCIAL

## 2021-10-22 DIAGNOSIS — R10.12 LUQ PAIN: ICD-10-CM

## 2021-10-22 LAB
ABSOLUTE EOS #: 0.25 K/UL (ref 0–0.44)
ABSOLUTE IMMATURE GRANULOCYTE: <0.03 K/UL (ref 0–0.3)
ABSOLUTE LYMPH #: 1.81 K/UL (ref 1.1–3.7)
ABSOLUTE MONO #: 0.33 K/UL (ref 0.1–1.2)
ALBUMIN SERPL-MCNC: 4.6 G/DL (ref 3.5–5.2)
ALBUMIN/GLOBULIN RATIO: 1.6 (ref 1–2.5)
ALP BLD-CCNC: 100 U/L (ref 35–104)
ALT SERPL-CCNC: 11 U/L (ref 5–33)
ANION GAP SERPL CALCULATED.3IONS-SCNC: 13 MMOL/L (ref 9–17)
AST SERPL-CCNC: 24 U/L
BASOPHILS # BLD: 1 % (ref 0–2)
BASOPHILS ABSOLUTE: 0.03 K/UL (ref 0–0.2)
BILIRUB SERPL-MCNC: 0.28 MG/DL (ref 0.3–1.2)
BUN BLDV-MCNC: 24 MG/DL (ref 8–23)
BUN/CREAT BLD: ABNORMAL (ref 9–20)
CA 19-9: 2 U/ML (ref 0–35)
CALCIUM SERPL-MCNC: 9.7 MG/DL (ref 8.6–10.4)
CARCINOEMBRYONIC ANTIGEN: 1.7 NG/ML
CHLORIDE BLD-SCNC: 103 MMOL/L (ref 98–107)
CO2: 23 MMOL/L (ref 20–31)
CREAT SERPL-MCNC: 0.74 MG/DL (ref 0.5–0.9)
DIFFERENTIAL TYPE: NORMAL
EOSINOPHILS RELATIVE PERCENT: 4 % (ref 1–4)
GFR AFRICAN AMERICAN: >60 ML/MIN
GFR NON-AFRICAN AMERICAN: >60 ML/MIN
GFR SERPL CREATININE-BSD FRML MDRD: ABNORMAL ML/MIN/{1.73_M2}
GFR SERPL CREATININE-BSD FRML MDRD: ABNORMAL ML/MIN/{1.73_M2}
GLUCOSE FASTING: 92 MG/DL (ref 70–99)
HCT VFR BLD CALC: 40.6 % (ref 36.3–47.1)
HEMOGLOBIN: 12.9 G/DL (ref 11.9–15.1)
IMMATURE GRANULOCYTES: 0 %
LYMPHOCYTES # BLD: 31 % (ref 24–43)
MCH RBC QN AUTO: 29.8 PG (ref 25.2–33.5)
MCHC RBC AUTO-ENTMCNC: 31.8 G/DL (ref 28.4–34.8)
MCV RBC AUTO: 93.8 FL (ref 82.6–102.9)
MONOCYTES # BLD: 6 % (ref 3–12)
NRBC AUTOMATED: 0 PER 100 WBC
PDW BLD-RTO: 12.2 % (ref 11.8–14.4)
PLATELET # BLD: 297 K/UL (ref 138–453)
PLATELET ESTIMATE: NORMAL
PMV BLD AUTO: 10.4 FL (ref 8.1–13.5)
POTASSIUM SERPL-SCNC: 4.2 MMOL/L (ref 3.7–5.3)
RBC # BLD: 4.33 M/UL (ref 3.95–5.11)
RBC # BLD: NORMAL 10*6/UL
SEG NEUTROPHILS: 58 % (ref 36–65)
SEGMENTED NEUTROPHILS ABSOLUTE COUNT: 3.41 K/UL (ref 1.5–8.1)
SODIUM BLD-SCNC: 139 MMOL/L (ref 135–144)
TOTAL PROTEIN: 7.4 G/DL (ref 6.4–8.3)
WBC # BLD: 5.8 K/UL (ref 3.5–11.3)
WBC # BLD: NORMAL 10*3/UL

## 2021-10-25 RX ORDER — MONTELUKAST SODIUM 10 MG/1
10 TABLET ORAL NIGHTLY
Qty: 90 TABLET | Refills: 3 | Status: SHIPPED | OUTPATIENT
Start: 2021-10-25 | End: 2022-10-25

## 2021-10-26 ENCOUNTER — TELEPHONE (OUTPATIENT)
Dept: NEUROLOGY | Age: 66
End: 2021-10-26

## 2021-11-08 ENCOUNTER — OFFICE VISIT (OUTPATIENT)
Dept: NEUROLOGY | Age: 66
End: 2021-11-08
Payer: COMMERCIAL

## 2021-11-08 VITALS
BODY MASS INDEX: 28.03 KG/M2 | SYSTOLIC BLOOD PRESSURE: 139 MMHG | HEIGHT: 67 IN | DIASTOLIC BLOOD PRESSURE: 86 MMHG | TEMPERATURE: 97.2 F | WEIGHT: 178.6 LBS

## 2021-11-08 DIAGNOSIS — M62.838 MUSCLE SPASM OF LEFT LOWER EXTREMITY: ICD-10-CM

## 2021-11-08 DIAGNOSIS — G20 PARKINSON'S DISEASE (HCC): Primary | ICD-10-CM

## 2021-11-08 DIAGNOSIS — M54.12 CERVICAL RADICULOPATHY: ICD-10-CM

## 2021-11-08 PROCEDURE — 99214 OFFICE O/P EST MOD 30 MIN: CPT | Performed by: NURSE PRACTITIONER

## 2021-11-08 PROCEDURE — 3017F COLORECTAL CA SCREEN DOC REV: CPT | Performed by: NURSE PRACTITIONER

## 2021-11-08 PROCEDURE — G8427 DOCREV CUR MEDS BY ELIG CLIN: HCPCS | Performed by: NURSE PRACTITIONER

## 2021-11-08 PROCEDURE — G8484 FLU IMMUNIZE NO ADMIN: HCPCS | Performed by: NURSE PRACTITIONER

## 2021-11-08 PROCEDURE — G8417 CALC BMI ABV UP PARAM F/U: HCPCS | Performed by: NURSE PRACTITIONER

## 2021-11-08 PROCEDURE — 1036F TOBACCO NON-USER: CPT | Performed by: NURSE PRACTITIONER

## 2021-11-08 RX ORDER — LEVETIRACETAM 500 MG/1
500 TABLET ORAL 2 TIMES DAILY
Qty: 60 TABLET | Refills: 3 | Status: SHIPPED | OUTPATIENT
Start: 2021-11-08 | End: 2022-03-08

## 2021-11-08 NOTE — PROGRESS NOTES
[facility]            [address]          [place], [address] [facility]          Dept: [phone]          Dept Fax: [phone]    MD Cristian Langston MD Ahmed B. Senora Rily, MD Glory Hopping, MD Talitha Lins, CNP            11/8/2021      HISTORY OF PRESENT ILLNESS:       I had the pleasure of seeing Trinh Michael, who returns for continuing neurologic care. The patient was seen last on September 21, 2021 for treatment of Parkinson's disease, left hip pain, left knee pain, and bilateral foot numbness and tingling. The patient has Parkinson's disease. She has increased tremor and increased stiffness and bradykinesia. The patient is having dyskinesias of her left hand especially during ambulation. The patient is prescribed Sinement 25/250 mg 1 tablet 3 times daily, Requip 4 mg three times daily, and Sinemet CR 50/200 mg 1 tablet at bedtime. The patient continues [facility] program for physical therapy. The patient was referred to Dr. Rafael Pearson at [facility] for movement disorder and has an appointment in December. The patient is here today reporting physical therapy is effective in helping her mobility and flexibility. The patient has tremor in the right upper extremitiy at rest. The patient has cogwheeling in her right arm. She states she also has neck pain in her upper neck which is associated with stiffness. The patient is here today reporting involuntary tightening in her left lower extremitiy. The tightening is associated with tightening with painful leg spasms. She states she tried to straightened her legs out and it curved back. She states it can last from 2 minutes to 15 minutes. One extreme case lasted for two hours. She states it happens everyday. She states it can happen when she is moving or when she is remaining still. She states there is no symptoms of onset but feeling of  her toes curving.  She tried to prevent it when it was onset by putting on her shoe but she stated this only prevented spasms for some minutes. There were no symptoms of myelopathy on her exam    The patient has left hip pain which is currently resolved. The patient has left knee pain secondary to a fall prior to her last visit. The patient follows with Dr. Nessa Diehl, orthopedic surgeon. The patient has bilateral foot numbness and tingling, concerning for possible neuropathy. An EMG/NCV study of her bilateral feet was ordered but not yet completed. The patient is prescribed Tizanidine 4 mg as needed at bedtime. Testing reviewed:    MRI Cervical Spine 11/15/2019  Impression   Multilevel degenerative disc disease with uncovertebral and facet hypertrophy   without canal stenosis or foraminal narrowing.                  PAST MEDICAL HISTORY:         Diagnosis Date    Allergic rhinitis     Asthma     Bursitis     bilateral hips    Cervical radiculopathy 9/17/2020    GERD (gastroesophageal reflux disease)     Headache(784.0)     Narcolepsy     Parkinson disease ([facility] [address]     Tremor         PAST SURGICAL HISTORY:         Procedure Laterality Date    FACIAL RECONSTRUCTION SURGERY  1987        SOCIAL HISTORY:     Social History     Socioeconomic History    Marital status:      Spouse name: Not on file    Number of children: Not on file    Years of education: Not on file    Highest education level: Not on file   Occupational History    Not on file   Tobacco Use    Smoking status: Passive Smoke Exposure - Never Smoker    Smokeless tobacco: Never Used   Vaping Use    Vaping Use: Never used   Substance and Sexual Activity    Alcohol use:  Yes     Alcohol/week: 0.0 standard drinks     Comment: occasionally    Drug use: No    Sexual activity: Yes   Other Topics Concern    Not on file   Social History Narrative    Not on file     Social Determinants of Health     Financial Resource Strain: Low Risk     Difficulty of Paying Living Expenses: Not hard at all   Food Insecurity: No Food Insecurity    Worried About [address] [place] [address] in the Last Year: Never true    Ran Out of Food in the Last Year: Never true   Transportation Needs:     Lack of Transportation (Medical): Not on file    Lack of Transportation (Non-Medical):  Not on file   Physical Activity:     Days of Exercise per Week: Not on file    Minutes of Exercise per Session: Not on file   Stress:     Feeling of Stress : Not on file   Social Connections:     Frequency of Communication with Friends and Family: Not on file    Frequency of Social Gatherings with Friends and Family: Not on file    Attends Mu-ism Services: Not on file    Active Member of [address] [place] [address] or Organizations: Not on file    Attends Club or Organization Meetings: Not on file    Marital Status: Not on file   Intimate Partner Violence:     Fear of Current or Ex-Partner: Not on file    Emotionally Abused: Not on file    Physically Abused: Not on file    Sexually Abused: Not on file   Housing Stability:     Unable to Pay for Housing in the Last Year: Not on file    Number of [place] in the Last Year: Not on file    Unstable Housing in the Last Year: Not on file       CURRENT MEDICATIONS:     Current Outpatient Medications   Medication Sig Dispense Refill    levETIRAcetam (KEPPRA) 500 MG tablet Take 1 tablet by mouth 2 times daily 60 tablet 3    montelukast (SINGULAIR) 10 MG tablet Take 1 tablet by mouth nightly 90 tablet 3    omeprazole (PRILOSEC) 20 MG delayed release capsule TAKE 1 CAPSULE DAILY 90 capsule 3    carbidopa-levodopa (SINEMET CR)  MG per extended release tablet TAKE 1 TABLET NIGHTLY 90 tablet 3    carbidopa-levodopa (SINEMET)  MG per tablet Take 1 tablet by mouth 3 times daily 270 tablet 3    rOPINIRole (REQUIP) 4 MG tablet Take 1 tablet by mouth 3 times daily 270 tablet 3    tiZANidine (ZANAFLEX) 4 MG tablet Take 1 tablet by mouth nightly 90 tablet 3  albuterol sulfate HFA (VENTOLIN HFA) 108 (90 Base) MCG/ACT inhaler Inhale 2 puffs into the lungs every 6 hours as needed for Wheezing 1 Inhaler 2     No current facility-administered medications for this visit. ALLERGIES:   No Known Allergies                              REVIEW OF SYSTEMS        All items selected indicate a positive finding. Those items not selected are negative. Constitutional [] Weight loss/gain   [] Fatigue  [] Fever/Chills   HEENT [] Hearing Loss  [] Visual Disturbance  [] Tinnitus  [] Eye pain   Respiratory [] Shortness of Breath  [] Cough  [] Snoring   Cardiovascular [] Chest Pain  [] Palpitations  [] Lightheaded   GI [] Constipation  [] Diarrhea  [] Swallowing change  [] Nausea/vomiting    [] Urinary Frequency  [] Urinary Urgency   Musculoskeletal [] Neck pain  [] Back pain  [x] Muscle pain  [] Restless legs   Dermatologic [] Skin changes   Neurologic [] Memory loss/confusion  [] Seizures  [x] Trouble walking or imbalance  [] Dizziness  [] Sleep disturbance  [] Weakness  [] Numbness  [x] Tremors  [] Speech Difficulty  [] Headaches  [] Light Sensitivity  [] Sound Sensitivity   Endocrinology []Excessive thirst  []Excessive hunger   Psychiatric [] Anxiety/Depression  [] Hallucination   Allergy/immunology []Hives/environmental allergies   Hematologic/lymph [] Abnormal bleeding  [] Abnormal bruising         PHYSICAL EXAMINATION:       Vitals:    11/08/21 1518   BP: 139/86   Temp: 97.2 °F (36.2 °C)                                              .                                                                                                     General Appearance:  Alert, cooperative, no signs of distress, appears stated age   Head:  Normocephalic, no signs of trauma   Eyes:  Conjunctiva/corneas clear;  eyelids intact   Ears:  Normal external ear and canals   Nose: Nares normal, mucosa normal, no drainage    Throat: Lips and tongue normal; teeth normal;  gums normal   Neck: Supple, intact flexion, extension and rotation;   trachea midline;  no adenopathy;   thyroid: not enlarged;   no carotid pulse abnormality   Back:   Symmetric, no curvature, ROM adequate   Lungs:   Respirations unlabored   Heart:  Regular rate and rhythm           Extremities: Extremities normal, no cyanosis, no edema   Pulses: Symmetric over head and neck   Skin: Skin color, texture normal, no rashes, no lesions                                     NEUROLOGIC EXAMINATION    Neurologic Exam  Mental status    Alert and oriented x 3; intact memory with no confusion, speech or language problems; no hallucinations or delusions  Fund of information appropriate for level of education    Cranial nerves    II - visual fields intact to confrontation bilaterally  III, IV, VI - extra-ocular muscles full: no pupillary defect; no EDWIGE, no nystagmus, no ptosis   V - normal facial sensation                                                               VII - normal facial symmetry                                                             VIII - intact hearing                                                                             IX, X - symmetrical palate                                                                  XI - symmetrical shoulder shrug                                                       XII - tongue midline without atrophy or fasciculation      Motor function  Normal muscle bulk and tone; strength 5/5 on all 4 extremities, no pronator drift, tremor at rest of her right hand      Sensory function Intact to light touch, pinprick, vibration, proprioception on all 4 extremities      Cerebellar Intact fine motor movement. No involuntary movements or tremors. No ataxia or dysmetria on finger to nose or heel to shin testing      Reflex function DTR 2+ on bilateral UE and LE, symmetric. Down going toes bilaterally, positive for cogwheel rigidity only on the right.        Gait                   normal base and arm swing Medical Decision Making: In summary, your patient, Rigo Peralta exhibits the following, with associated plan:    1. Parkinson's disease. She has increased tremor and increased stiffness and bradykinesia. The patient is having dyskinesias of her left hand especially during ambulation. 1. Continue Sinemet 25/250 mg 1 tablet 3 times daily   2. Continue Requip 4 mg 3 times daily   3. Continue Sinemet CR 50/200 mg 1 tablet at bedtime   4. Continue [facility] physical therapy. 5. [facility] at the [facility] in December  2. Left hip pain, resolved. 3. Involuntary tightening in her left lower extremitiy. The tightening is associated with  painful leg spasms. She states this happens everyday and lasts for 2 minutes to 2 hours. They can occur when she is moving or at rest. She states there is no onset symptoms besides toes curving. 1. Obtain EEG  awake and asleep  2. Start Keppra 500 mg twice daily. Because her symptoms happen every day, we will trial this for 2 to 4 weeks. If there is no response, we will pursue other possible etiologies. 4. Increased left knee pain secondary to a recent fall. Referred to Dr. Sean Rodriguez, orthopedic surgeon. 5. Bilateral foot numbness and tingling, concerning for a possible neuropathy. 1. Obtain EMG/NCV study of bilateral feet   2. Continue Tizanidine 4 mg as needed at bedtime   3. Return for follow up in 6 weeks. Signed: Govind Hand CNP      *Please note that portions of this note were completed with a voice recognition program.  Although every effort was made to insure the accuracy of this automated transcription, some errors in transcription may have occurred, occasionally words and are mis-transcribed    Provider Attestation: The documentation recorded by the scribe accurately reflects the service I personally performed and the decisions made by myself. Portions of this note were transcribed by a scribe.  I personally performed

## 2021-12-10 ENCOUNTER — HOSPITAL ENCOUNTER (OUTPATIENT)
Age: 66
Setting detail: SPECIMEN
Discharge: HOME | End: 2021-12-10

## 2021-12-10 ENCOUNTER — OFFICE VISIT (OUTPATIENT)
Dept: PRIMARY CARE CLINIC | Age: 66
End: 2021-12-10
Payer: COMMERCIAL

## 2021-12-10 VITALS
WEIGHT: 178 LBS | HEART RATE: 85 BPM | BODY MASS INDEX: 27.88 KG/M2 | DIASTOLIC BLOOD PRESSURE: 78 MMHG | OXYGEN SATURATION: 97 % | TEMPERATURE: 98.2 F | SYSTOLIC BLOOD PRESSURE: 118 MMHG

## 2021-12-10 DIAGNOSIS — R68.89 FLU-LIKE SYMPTOMS: Primary | ICD-10-CM

## 2021-12-10 PROCEDURE — G8417 CALC BMI ABV UP PARAM F/U: HCPCS | Performed by: FAMILY MEDICINE

## 2021-12-10 PROCEDURE — 99214 OFFICE O/P EST MOD 30 MIN: CPT | Performed by: FAMILY MEDICINE

## 2021-12-10 PROCEDURE — 1036F TOBACCO NON-USER: CPT | Performed by: FAMILY MEDICINE

## 2021-12-10 PROCEDURE — G8427 DOCREV CUR MEDS BY ELIG CLIN: HCPCS | Performed by: FAMILY MEDICINE

## 2021-12-10 PROCEDURE — G8484 FLU IMMUNIZE NO ADMIN: HCPCS | Performed by: FAMILY MEDICINE

## 2021-12-10 PROCEDURE — 3017F COLORECTAL CA SCREEN DOC REV: CPT | Performed by: FAMILY MEDICINE

## 2021-12-10 NOTE — PROGRESS NOTES
Subjective:  Tr Arce presents for   Chief Complaint   Patient presents with    Nasal Congestion     1 wk onset; otc medications provided no relief; ears mufffled, eyes watering    Headache     rapid at home covid negative     Did the home test yesterday  Place of employment/school: is off on medical leave  Exposure history to COVID-19: no  Has patient been vaccinated? Which product and when? Pfizer , not gotten the booster yes  Length of symptoms? 7 days. Sx seem to be wosrening , having more myalgia    SOB: slight  Dry Cough: yes. Was productive intinally    Nasal congestion/rhinorrhea: yes  Sinus pressure: yes  Sore throat: yes    Any GI sx? no    Fever: has some chills  Myalgias: yes  Fatigue: yes    Fluid intake: down  Appetite: fine    What was done to alleviate symptoms? Tried some but no help    Risk Factors  Smoker?: no  COPD/underlying lung disease?: no  CAD/CHF?: no  DM2?: no  CKD?: no  Liver disease?: no  Immunosuppressed or current chemotherapy use?: no  History of sickle cell? no  Steroid use?no  Travel recently/Where?: no      Objective:  Physical Exam   Vitals:   Vitals:    12/10/21 1409   BP: 118/78   Site: Left Upper Arm   Position: Sitting   Cuff Size: Small Adult   Pulse: 85   Temp: 98.2 °F (36.8 °C)   TempSrc: Tympanic   SpO2: 97%   Weight: 178 lb (80.7 kg)     Wt Readings from Last 3 Encounters:   12/10/21 178 lb (80.7 kg)   11/08/21 178 lb 9.6 oz (81 kg)   10/14/21 178 lb 12.8 oz (81.1 kg)     Ht Readings from Last 3 Encounters:   11/08/21 5' 7\" (1.702 m)   10/14/21 5' 6\" (1.676 m)   09/21/21 5' 6\" (1.676 m)     Body mass index is 27.88 kg/m². Constitutional: She is oriented to person, place, and time. She appears well-developed and well-nourished and in no acute distress. Answers all my questions appropriately. Head: Normocephalic and atraumatic.    Eyes:conjunctiva appear normal.  Right Ear: External ear normal. TM is clear  Left Ear: External ear normal. TM is clear  Nose: pink, non-edematous mucosa. No polyps. No septal deviation  Throat: no erythema, tonsillar hypertrophy or exudate. No ulcerations noted. Lips/Teeth/Gums all appear normal.  Neck: Normal range of motion. Neck supple. No tracheal deviation present. No abnormal lymphadenopathy. Heart - RRR w/o murmur. No S3/S4 noted  Chest: Clear to auscultation bilaterally. Good breath sounds noted. No rales, wheezes, or rhonchi noted. No respiratory retractions noted. Wall has symmetrical movement with respirations. COVID-19 pcr:  Pending  Assessment:   Encounter Diagnosis   Name Primary?  Flu-like symptoms Yes         Plan:   Advised patitent our first concern is that of covid. He sx are minimal and do not point to any specific dx requiring an antibiotic at this time. The only concern I have is the myalgia which she is having which is new - hence check for covid. There are no discontinued medications. THE ABOVE NOTED DISCONTINUED MEDS MAY ONLY BE FROM 'CLEANING UP' THE MED LIST AND WERE NOT ACTUALLY CANCELED;  SEE CHART FOR DETAILS! No orders of the defined types were placed in this encounter. Orders Placed This Encounter   Procedures    COVID-19     Standing Status:   Future     Standing Expiration Date:   12/10/2022     Scheduling Instructions:      1) Due to current limited availability of the COVID-19 test, tests will be prioritized based on responses to questions above. Testing may be delayed due to volume. 2) Print and instruct patient to adhere to CDC home isolation program. (Link Above)              3) Set up or refer patient for a monitoring program.              4) Have patient sign up for and leverage MyChart (if not previously done). Order Specific Question:   Is this test for diagnosis or screening? Answer:   Diagnosis of ill patient     Order Specific Question:   Symptomatic for COVID-19 as defined by CDC?      Answer:   Yes     Order Specific Question:   Date of Symptom Onset     Answer:   12/3/2021     Order Specific Question:   Hospitalized for COVID-19? Answer:   No     Order Specific Question:   Admitted to ICU for COVID-19? Answer:   No     Order Specific Question:   Employed in healthcare setting? Answer:   No     Order Specific Question:   Resident in a congregate (group) care setting? Answer:   No     Order Specific Question:   Pregnant? Answer:   No     Order Specific Question:   Previously tested for COVID-19? Answer:   Unknown     Return in about 2 weeks (around 12/24/2021). Patient Instructions   The COVID-19 test that was done today can take 1-6 days for results. Until then you should assume you have this disease and adhere to home isolation as described below. When we get the test results back, one of the following readings will be obtained. 1. A positive test means you have the virus. 2.  An inconclusive test means it wasn't sure if you have the virus or not. An inconclusive test result is treated as a positive result and recommendations  are the same as a positive test result. We may ask you to repeat this test in this circumstance. 3.  A negative test means you probably do not have the virus, but it is not conclusive. If your results of the COVID-19 test is POSITIVE- you must isolate yourself from others. You can be around others after:    10 days since symptoms first appeared (immunocompromised will quarantine for 20 days) and  24 hours with no fever without the use of fever-reducing medications and  Other symptoms of COVID-19 are improving*  *Loss of taste and smell may persist for weeks or months after recovery and need not delay the end of isolation  For people who are immunocopromised, quarantine may last for 20 days.     Most people do not require testing to decide when they can be around others; however, if your healthcare provider recommends testing, they will let you know when you can resume being around others based on your test results. Note that these recommendations do not apply to persons with severe COVID-19 or with severely weakened immune systems (immunocompromised). These persons should follow the guidance below for I was severely ill with COVID-19 or have a severely weakened immune system (immunocompromised) due to a health condition or medication. When can I be around others?     [email or web address] html    Prevention steps for People with positive or inconclusive test results or suspected  COVID-19 (including persons under investigation) who do not need to be hospitalized  and   People with confirmed COVID-19 who were hospitalized and determined to be medically stable to go home    Contacts who are NOT healthcare providers or first responders and are asymptomatic (no fever,  cough, shortness of breath, or difficulty breathing) should self-quarantine for 14 days from the last  date of exposure to confirmed COVID-19. Your healthcare provider and public health staff will evaluate whether you can be cared for at home. If it is determined that you do not need to be hospitalized and can be isolated at home, you will be monitored by staff from your health department. You should follow the prevention steps below until a healthcare provider or local or state health department says you can return to your normal activities. Stay home except to get medical care  People who are mildly ill with COVID-19 are able to isolate at home during their illness. You should restrict activities outside your home, except for getting medical care. Do not go to work, school, or public areas. Avoid using public transportation, ride-sharing, or taxis. Separate yourself from other people and animals in your home  People: As much as possible, you should stay in a specific room and away from other people in your home.  Also, you should use a separate bathroom, if available. Animals: You should restrict contact with pets and other animals while you are sick with COVID-19, just like you would around other people. When possible, have another member of your household care for your animals while you are sick. If you are sick with COVID-19, avoid contact with your pet, including petting, snuggling, being kissed or licked, and sharing food. If you must care for your pet or be around animals while you are sick, wash your hands before and after you interact with pets and wear a facemask. Call ahead before visiting your doctor  If you have a medical appointment, call the healthcare provider and tell them that you have or may have COVID-19. This will help the healthcare providers office take steps to keep other people from getting infected or exposed. Wear a facemask  You should wear a facemask when you are around other people (e.g., sharing a room or vehicle) or pets and before you enter a healthcare providers office. If you are not able to wear a facemask (for example, because it causes trouble breathing), then people who live with you should not stay in the same room with you; they should also wear a facemask if they enter your room. Cover your coughs and sneezes  Cover your mouth and nose with a tissue when you cough or sneeze. Throw used tissues in a lined trash can. Immediately wash your hands with soap and water for at least 20 seconds or, if soap and water are not available, clean your hands with an alcohol-based hand  that contains at least 60% alcohol. Clean your hands often  Wash your hands often with soap and water for at least 20 seconds, especially after blowing your nose, coughing, or sneezing; going to the bathroom; and before eating or preparing food. If soap and water are not readily available, use an alcohol-based hand  with at least 60% alcohol, covering all surfaces of your hands and rubbing them together until they feel dry.   Soap and water are the best option if hands are visibly dirty. Avoid touching your eyes, nose, and mouth with unwashed hands. Avoid sharing personal household items  You should not share dishes, drinking glasses, cups, eating utensils, towels, or bedding with other people or pets in your home. After using these items, they should be washed thoroughly with soap and water. Clean all high-touch surfaces everyday  High touch surfaces include counters, tabletops, doorknobs, bathroom fixtures, toilets, phones, keyboards, tablets, and bedside tables. Also, clean any surfaces that may have blood, stool, or body fluids on them. Use a household cleaning spray or wipe, according to the label instructions. Labels contain instructions for safe and effective use of the cleaning product including precautions you should take when applying the product, such as wearing gloves and making sure you have good ventilation during use of the product. Monitor your symptoms  Seek prompt medical attention if your illness is worsening (e.g., difficulty breathing). Before seeking care, call your healthcare provider and tell them that you have, or are being evaluated for, COVID-19. Put on a facemask before you enter the facility. These steps will help the healthcare providers office to keep other people in the office or waiting room from getting infected or exposed. Persons who are placed under active monitoring or facilitated self-monitoring should follow instructions provided by their local health department or occupational health professionals, as appropriate. When working with your local health department check their available hours. If you have a medical emergency and need to call 911, notify the dispatch personnel that you have, or are being evaluated for COVID-19. If possible, put on a facemask before emergency medical services arrive.   Discontinuing home isolation  Patients with confirmed COVID-19 should remain under home isolation precautions until the risk of secondary transmission to others is thought to be low. The decision to discontinue home isolation precautions should be made on a case-by-case basis, in consultation with your physician and the health department. Please do NOT make this decision on your own. [place]- keeps someone who might have been exposed to the virus away from others  Isolation - keeps someone who is infected with the virus away from others, even in their homes    Scenario 1    Your patient has close contact with an individual who has COVID-19. Your patient will not have further contact. Plan - Your patient is quarantined from the last day of contact for 14 days    Scenario 2   Your patient has lives with someone who has COVID-19 but can avoid further contact. Plan - Your patient is quarantined for 14 days starting when the person with COVID-19 begins home isolation. Scenario 3    Your patient is under quarantine and has additional close contact with someone else who has COVID-19. Plan - Your patient must restart quarantine from the last COVID-19 exposure. Scenario 4   Your patient lives with someone who has COVID-19 and cannot avoid close contact from them. Plan - Your patient is quarantined while the other person is isolating and for 14 days after covid - 23 person meets the criteria to end home isolation. Treatment with monclonoal antibodies is under investigation and can be considered for patients with mild to moderate COVID-19 who are not on supplemental oxygen and are not hospitalized but who are at [address] [place] [address] for clinical progression have had onset of symptoms within the past 10 days. HIGH RISK is defined as patients who meet at least one of the following criteria:    Have a body mass index (BMI) ? 28    Have chronic kidney disease    Have diabetes    Have immunosuppressive disease    Are currently receiving immunosuppressive treatment    Are ?72years of age  Arden Sampson  Are ?54 years of age AND have  *cardiovascular disease, OR  *hypertension, OR  *chronic obstructive pulmonary disease/other chronic respiratory disease.   Are 15- 16years of age AND have  *BMI ? 85th percentile for their age and gender based on CDC growth charts, [email or web address] , OR  *sickle cell disease, OR  *congenital or acquired heart disease, OR  *neurodevelopmental disorders, for example, cerebral palsy, OR  *a medical-related technological dependence, for example, tracheostomy, gastrostomy, or positive pressure ventilation (not related to   COVID-19), OR  *asthma, reactive airway or other chronic respiratory disease that requires daily medication for control. Other risk factors:   Moderate/severe dementia   Cancer being treated with palliative care   Cirrhosis   Pregnancy   Breast feeding   Weight less than 40Kg (88lbs)      If your results of the COVID-19 test is NEGATIVE -     The patient may stop isolation, in consultation with your health care provider, typically when: Your healthcare provider has determined that the cause of the illness is NOT COVID-19 and approves your return to work. OR  Ten (10) days have passed since onset of symptoms AND one day (24 hours) have passed with no fever without taking medication (like Tylenol) to reduce fever,  respiratory symptoms have resolved and you have been evaluated by your health care provider. Please follow up with your physician for evaluation about this. COVID-19 EXPOSURE    Quarantine  Quarantine if you have been in close contact (within 6 feet of someone for a cumulative total of 15 minutes or more over a 24-hour period) with someone who has COVID-19, unless you have been fully vaccinated. People who are fully vaccinated do NOT need to quarantine after contact with someone who had COVID-19 unless they have symptoms.  However, fully vaccinated people should get tested 3-5 days after their exposure, even if they dont have symptoms and wear a mask indoors in public for 14 days following exposure or until their test result is negative. The following websites are the best places for up to date information on this fluid situation. [email or web address]    The following applies to a person who has clinically recovered from  SARS-CoV-2 infection that was confirmed with a viral diagnostic test and then, within 3 months since the date of symptom onset of the previous illness episode (or date of positive viral diagnostic test if the person never experienced symptoms), is identified as a contact of a new case. If the person remains asymptomatic since the new exposure, then they do not need to be retested for SARS-CoV-2 and do not need to be quarantined. However, if the person experiences new symptoms consistent with COVID-19 and an evaluation fails to identify a diagnosis other than SARS-CoV-2 infection (e.g., influenza), then repeat viral diagnostic testing may be warranted, in consultation with an infectious disease specialist and public health authorities for isolation guidance. Follow up with provider. This visit was provided as a focused evaluation during the COVID -19 pandemic/national emergency. A comprehensive review of all previous patient history and testing was not conducted. Pertinent findings were elicited during the visit.

## 2021-12-10 NOTE — PATIENT INSTRUCTIONS
The COVID-19 test that was done today can take 1-6 days for results. Until then you should assume you have this disease and adhere to home isolation as described below. When we get the test results back, one of the following readings will be obtained. 1. A positive test means you have the virus. 2.  An inconclusive test means it wasn't sure if you have the virus or not. An inconclusive test result is treated as a positive result and recommendations  are the same as a positive test result. We may ask you to repeat this test in this circumstance. 3.  A negative test means you probably do not have the virus, but it is not conclusive. If your results of the COVID-19 test is POSITIVE- you must isolate yourself from others. You can be around others after:    10 days since symptoms first appeared (immunocompromised will quarantine for 20 days) and  24 hours with no fever without the use of fever-reducing medications and  Other symptoms of COVID-19 are improving*  *Loss of taste and smell may persist for weeks or months after recovery and need not delay the end of isolation  For people who are immunocopromised, quarantine may last for 20 days. Most people do not require testing to decide when they can be around others; however, if your healthcare provider recommends testing, they will let you know when you can resume being around others based on your test results. Note that these recommendations do not apply to persons with severe COVID-19 or with severely weakened immune systems (immunocompromised). These persons should follow the guidance below for I was severely ill with COVID-19 or have a severely weakened immune system (immunocompromised) due to a health condition or medication. When can I be around others?     [email or web address]    Prevention steps for People with positive or inconclusive test results or suspected  COVID-19 (including persons under investigation) who do not need to be hospitalized  and   People with confirmed COVID-19 who were hospitalized and determined to be medically stable to go home    Contacts who are NOT healthcare providers or first responders and are asymptomatic (no fever,  cough, shortness of breath, or difficulty breathing) should self-quarantine for 14 days from the last  date of exposure to confirmed COVID-19. Your healthcare provider and public health staff will evaluate whether you can be cared for at home. If it is determined that you do not need to be hospitalized and can be isolated at home, you will be monitored by staff from your health department. You should follow the prevention steps below until a healthcare provider or local or state health department says you can return to your normal activities. Stay home except to get medical care  People who are mildly ill with COVID-19 are able to isolate at home during their illness. You should restrict activities outside your home, except for getting medical care. Do not go to work, school, or public areas. Avoid using public transportation, ride-sharing, or taxis. Separate yourself from other people and animals in your home  People: As much as possible, you should stay in a specific room and away from other people in your home. Also, you should use a separate bathroom, if available. Animals: You should restrict contact with pets and other animals while you are sick with COVID-19, just like you would around other people. When possible, have another member of your household care for your animals while you are sick. If you are sick with COVID-19, avoid contact with your pet, including petting, snuggling, being kissed or licked, and sharing food. If you must care for your pet or be around animals while you are sick, wash your hands before and after you interact with pets and wear a facemask.   Call ahead before visiting your doctor  If you have a further contact. Plan - Your patient is quarantined from the last day of contact for 14 days    Scenario 2   Your patient has lives with someone who has COVID-19 but can avoid further contact. Plan - Your patient is quarantined for 14 days starting when the person with COVID-19 begins home isolation. Scenario 3    Your patient is under quarantine and has additional close contact with someone else who has COVID-19. Plan - Your patient must restart quarantine from the last COVID-19 exposure. Scenario 4   Your patient lives with someone who has COVID-19 and cannot avoid close contact from them. Plan - Your patient is quarantined while the other person is isolating and for 14 days after covid - 23 person meets the criteria to end home isolation. Treatment with monclonoal antibodies is under investigation and can be considered for patients with mild to moderate COVID-19 who are not on supplemental oxygen and are not hospitalized but who are at [address] [place] [address] for clinical progression have had onset of symptoms within the past 10 days. HIGH RISK is defined as patients who meet at least one of the following criteria:    Have a body mass index (BMI) ? 28    Have chronic kidney disease    Have diabetes    Have immunosuppressive disease    Are currently receiving immunosuppressive treatment    Are ?72years of age  [employer]  Are ?54years of age AND have  *cardiovascular disease, OR  *hypertension, OR  *chronic obstructive pulmonary disease/other chronic respiratory disease.   Are 15- 16years of age AND have  *BMI ? 85th percentile for their age and gender based on CDC growth charts, [email or web address] , OR  *sickle cell disease, OR  *congenital or acquired heart disease, OR  *neurodevelopmental disorders, for example, cerebral palsy, OR  *a medical-related technological dependence, for example, tracheostomy, gastrostomy, or positive pressure ventilation (not related to COVID-19), OR  *asthma, reactive airway or other chronic respiratory disease that requires daily medication for control. Other risk factors:   Moderate/severe dementia   Cancer being treated with palliative care   Cirrhosis   Pregnancy   Breast feeding   Weight less than 40Kg (88lbs)      If your results of the COVID-19 test is NEGATIVE -     The patient may stop isolation, in consultation with your health care provider, typically when: Your healthcare provider has determined that the cause of the illness is NOT COVID-19 and approves your return to work. OR  Ten (10) days have passed since onset of symptoms AND one day (24 hours) have passed with no fever without taking medication (like Tylenol) to reduce fever,  respiratory symptoms have resolved and you have been evaluated by your health care provider. Please follow up with your physician for evaluation about this. COVID-19 EXPOSURE    Quarantine  Quarantine if you have been in close contact (within 6 feet of someone for a cumulative total of 15 minutes or more over a 24-hour period) with someone who has COVID-19, unless you have been fully vaccinated. People who are fully vaccinated do NOT need to quarantine after contact with someone who had COVID-19 unless they have symptoms. However, fully vaccinated people should get tested 3-5 days after their exposure, even if they dont have symptoms and wear a mask indoors in public for 14 days following exposure or until their test result is negative. The following websites are the best places for up to date information on this fluid situation.     [employer]Weight.co.[email or web address]    The following applies to a person who has clinically recovered from  SARS-CoV-2 infection that was confirmed with a viral diagnostic test and then, within 3 months since the date of symptom onset of the previous illness episode (or date of positive viral diagnostic test if the person never experienced symptoms), is identified as a contact of a new case. If the person remains asymptomatic since the new exposure, then they do not need to be retested for SARS-CoV-2 and do not need to be quarantined. However, if the person experiences new symptoms consistent with COVID-19 and an evaluation fails to identify a diagnosis other than SARS-CoV-2 infection (e.g., influenza), then repeat viral diagnostic testing may be warranted, in consultation with an infectious disease specialist and public health authorities for isolation guidance.

## 2021-12-11 DIAGNOSIS — R68.89 FLU-LIKE SYMPTOMS: ICD-10-CM

## 2021-12-11 LAB
SARS-COV-2: NORMAL
SARS-COV-2: NOT DETECTED
SOURCE: NORMAL

## 2021-12-16 ENCOUNTER — TELEPHONE (OUTPATIENT)
Dept: NEUROLOGY | Age: 66
End: 2021-12-16

## 2021-12-20 DIAGNOSIS — G20 PARKINSON'S DISEASE (HCC): ICD-10-CM

## 2021-12-20 RX ORDER — CARBIDOPA AND LEVODOPA 50; 200 MG/1; MG/1
TABLET, EXTENDED RELEASE ORAL
Qty: 90 TABLET | Refills: 3 | Status: SHIPPED | OUTPATIENT
Start: 2021-12-20

## 2021-12-20 NOTE — TELEPHONE ENCOUNTER
Pharmacy requesting a  refill of: Sinemet 50-200mg     Medication active on med list yes     Date of last prescription: 01/11/2021  with 3  refills verified on 12/20/2021  verified by DEANNE THOMAS     Date of last appointment: 11/08/2021     Next Visit Date:  12/23/2021

## 2021-12-28 ENCOUNTER — TELEPHONE (OUTPATIENT)
Dept: GASTROENTEROLOGY | Age: 66
End: 2021-12-28

## 2021-12-28 NOTE — TELEPHONE ENCOUNTER
Pt is not established with this office. 1st attempt; called patient regarding colon screen referral but no answer, unable to lvm d/t full mailbox. 2nd attempt; mailed colon screen letter to patient's home address.

## 2022-01-10 DIAGNOSIS — K21.9 GASTROESOPHAGEAL REFLUX DISEASE: ICD-10-CM

## 2022-01-10 RX ORDER — OMEPRAZOLE 20 MG/1
CAPSULE, DELAYED RELEASE ORAL
Qty: 90 CAPSULE | Refills: 3 | Status: SHIPPED | OUTPATIENT
Start: 2022-01-10

## 2022-01-10 NOTE — TELEPHONE ENCOUNTER
Last visit: 10/14/21      Next Visit Date:  Future Appointments   Date Time Provider Martine Mendez   3/21/2022 10:40 AM SHIVA Gu - CNP Neuro [facility]   Topic Date Due    Breast cancer screen  Never done    Cervical cancer screen  12/19/2019    Colon Cancer Screen FIT/FOBT  01/22/2020    COVID-19 Vaccine (3 - Booster for Pfizer series) 09/25/2021    DTaP/Tdap/Td vaccine (1 - Tdap) 10/14/2022 (Originally 7/23/1976)    Flu vaccine (1) 10/14/2022 (Originally 9/1/2021)    Shingles Vaccine (1 of 2) 10/14/2022 (Originally 7/23/2007)    Pneumococcal 0-64 years Vaccine (1 of 2 - PPSV23) 10/14/2022 (Originally 7/23/1963)    Depression Screen  10/14/2022    Lipid screen  01/22/2024    Hepatitis C screen  Completed    HIV screen  Completed    Hepatitis A vaccine  Aged Out    Hepatitis B vaccine  Aged Out    Hib vaccine  Aged Out    Meningococcal (ACWY) vaccine  Aged Out       Hemoglobin A1C (%)   Date Value   01/22/2019 5.2             ( goal A1C is < 7)   No results found for: LABMICR  LDL Cholesterol (mg/dL)   Date Value   01/22/2019 119       (goal LDL is <100)   AST (U/L)   Date Value   10/22/2021 24     ALT (U/L)   Date Value   10/22/2021 11     BUN (mg/dL)   Date Value   10/22/2021 24 (H)     BP Readings from Last 3 Encounters:   12/10/21 118/78   11/08/21 139/86   10/14/21 136/84          (goal 120/80)    All Future Testing planned in [facility]  Lab Frequency Next Occurrence   EEG awake and asleep Once 01/22/2022               Patient Active Problem List:     Parkinson's disease ([address]     Seasonal allergic rhinitis due to pollen     Asthma     Gastroesophageal reflux disease     Neck pain     Cervical radiculopathy     Neuropathy

## 2022-02-10 NOTE — TELEPHONE ENCOUNTER
3rd and final attempt: writer called and spoke to pt about scheduling colon per referral. Pt declined and stated she is off work on medical leave and it's not a good time. Writer advised pt we will send referral back to provider and when it is a better time for her she will need to have them send over a new referral. Pt agreed.

## 2022-03-08 ENCOUNTER — OFFICE VISIT (OUTPATIENT)
Dept: FAMILY MEDICINE CLINIC | Age: 67
End: 2022-03-08
Payer: COMMERCIAL

## 2022-03-08 VITALS
HEART RATE: 86 BPM | RESPIRATION RATE: 18 BRPM | BODY MASS INDEX: 27.78 KG/M2 | OXYGEN SATURATION: 98 % | SYSTOLIC BLOOD PRESSURE: 112 MMHG | DIASTOLIC BLOOD PRESSURE: 62 MMHG | HEIGHT: 67 IN | WEIGHT: 177 LBS | TEMPERATURE: 98.7 F

## 2022-03-08 DIAGNOSIS — K52.9 ACUTE GASTROENTERITIS: Primary | ICD-10-CM

## 2022-03-08 DIAGNOSIS — G43.101 MIGRAINE WITH AURA AND WITH STATUS MIGRAINOSUS, NOT INTRACTABLE: ICD-10-CM

## 2022-03-08 DIAGNOSIS — Z09 HOSPITAL DISCHARGE FOLLOW-UP: ICD-10-CM

## 2022-03-08 DIAGNOSIS — I88.0 MESENTERIC ADENITIS: ICD-10-CM

## 2022-03-08 PROCEDURE — 1111F DSCHRG MED/CURRENT MED MERGE: CPT | Performed by: STUDENT IN AN ORGANIZED HEALTH CARE EDUCATION/TRAINING PROGRAM

## 2022-03-08 PROCEDURE — 99214 OFFICE O/P EST MOD 30 MIN: CPT | Performed by: STUDENT IN AN ORGANIZED HEALTH CARE EDUCATION/TRAINING PROGRAM

## 2022-03-08 RX ORDER — ELETRIPTAN HYDROBROMIDE 40 MG/1
40 TABLET, FILM COATED ORAL
Qty: 12 TABLET | Refills: 3 | Status: SHIPPED | OUTPATIENT
Start: 2022-03-08 | End: 2022-03-23 | Stop reason: SDUPTHER

## 2022-03-08 NOTE — PROGRESS NOTES
Post-Discharge Transitional Care Management Progress Note      Raymon Peabody   YOB: 1955    Date of Office Visit:  3/8/2022  Date of Hospital Admission: 2/19  Date of Hospital Discharge: 2/23    Care management risk score Rising risk (score 2-5) and Complex Care (Scores >=6): 2     Non face to face  following discharge, date last encounter closed (first attempt may have been earlier): *No documented post hospital discharge outreach found in the last 14 days *No documented post hospital discharge outreach found in the last 14 days    Call initiated 2 business days of discharge: *No response recorded in the last 14 days    ASSESSMENT/PLAN:   Acute gastroenteritis  -     ME DISCHARGE MEDS RECONCILED W/ CURRENT OUTPATIENT MED LIST  Mesenteric adenitis  -     [address] [place] [address] discharge follow-up  -     ME DISCHARGE MEDS RECONCILED W/ CURRENT OUTPATIENT MED LIST      Medical Decision Making: moderate complexity  Return in 3 months (on 6/8/2022). On this date 3/8/2022 I have spent 30 minutes reviewing previous notes, test results and face to face with the patient discussing the diagnosis and importance of compliance with the treatment plan as well as documenting on the day of the visit. Subjective:   HPI:  Follow up of Hospital problems/diagnosis(es):     Inpatient course: Discharge summary reviewed- see chart.     Interval history/Current status: was seen in ED for terrible headache, fever, and GI symptoms    Was found to have abd lymphadenopathy 1.5 cm largest  Considered to be reactive  Has f/u with Dr. Kera Vázquez this Thursday [facility] heme onc    Symptoms improving    She is changing her neurology provider    Patient Active Problem List   Diagnosis    Parkinson's disease ([facility]    Seasonal allergic rhinitis due to pollen    Asthma    Gastroesophageal reflux disease    Neck pain    Cervical radiculopathy    Neuropathy Medications marked \"taking\" at this time  Outpatient Medications Marked as Taking for the 3/8/22 encounter (Office Visit) with Brenna Coughlin MD   Medication Sig Dispense Refill    omeprazole (PRILOSEC) 20 MG delayed release capsule TAKE 1 CAPSULE DAILY 90 capsule 3    carbidopa-levodopa (SINEMET CR)  MG per extended release tablet TAKE 1 TABLET NIGHTLY 90 tablet 3    levETIRAcetam (KEPPRA) 500 MG tablet Take 1 tablet by mouth 2 times daily 60 tablet 3    montelukast (SINGULAIR) 10 MG tablet Take 1 tablet by mouth nightly 90 tablet 3    carbidopa-levodopa (SINEMET)  MG per tablet Take 1 tablet by mouth 3 times daily 270 tablet 3    albuterol sulfate HFA (VENTOLIN HFA) 108 (90 Base) MCG/ACT inhaler Inhale 2 puffs into the lungs every 6 hours as needed for Wheezing 1 Inhaler 2        Medications patient taking as of now reconciled against medications ordered at time of hospital discharge: Yes    A comprehensive review of systems was negative except for what was noted in the HPI. Objective:    Temp 98.7 °F (37.1 °C) (Tympanic)   LMP 02/20/2010   SpO2 (!) 18%   General Appearance: alert and oriented to person, place and time, well-developed and well-nourished, in no acute distress  Abdomen: soft, non-tender, non-distended, normal bowel sounds, no masses or organomegaly    An electronic signature was used to authenticate this note.   --Brenna Coughlin MD

## 2022-03-11 NOTE — TELEPHONE ENCOUNTER
Waiting to hear back from patient. Patient's med list indicates that the patient is no longer taking tizanidine. Need to confirm refill.

## 2022-03-11 NOTE — TELEPHONE ENCOUNTER
Pharmacy requesting refill of:  tizanidine (zanaflex) 4mg tablet      Medication active on med list:  yes      Date of last Rx: 1/11/2021  with 3 refills   verified on 03/11/2021   verified by VILMA ISSA      Date of last appointment:    11/8/2021    Next Visit Date:  3/21/2022

## 2022-03-14 RX ORDER — TIZANIDINE 4 MG/1
4 TABLET ORAL NIGHTLY
Qty: 90 TABLET | Refills: 3 | Status: SHIPPED | OUTPATIENT
Start: 2022-03-14 | End: 2022-07-25 | Stop reason: SDUPTHER

## 2022-03-18 ENCOUNTER — TELEPHONE (OUTPATIENT)
Dept: PRIMARY CARE CLINIC | Age: 67
End: 2022-03-18

## 2022-03-18 NOTE — TELEPHONE ENCOUNTER
----- Message from Esha Greenfield sent at 3/18/2022  1:55 PM EDT -----  Subject: Message to Provider    QUESTIONS  Information for Provider? Patient is calling because she would like to   speak with Ellis Hunter regarding her condition. She needs a call as   soon as possible. Please advise  ---------------------------------------------------------------------------  --------------  CALL BACK INFO  What is the best way for the office to contact you? OK to leave message on   voicemail  Preferred Call Back Phone Number? [phone]  ---------------------------------------------------------------------------  --------------  SCRIPT ANSWERS  Relationship to Patient?  Self

## 2022-03-19 RX ORDER — LEVETIRACETAM 500 MG/1
TABLET ORAL
COMMUNITY
Start: 2022-03-12

## 2022-03-22 ENCOUNTER — HOSPITAL ENCOUNTER (OUTPATIENT)
Age: 67
Setting detail: SPECIMEN
Discharge: HOME | End: 2022-03-22

## 2022-03-22 ENCOUNTER — OFFICE VISIT (OUTPATIENT)
Dept: FAMILY MEDICINE CLINIC | Age: 67
End: 2022-03-22
Payer: COMMERCIAL

## 2022-03-22 VITALS
WEIGHT: 171.6 LBS | HEIGHT: 66 IN | SYSTOLIC BLOOD PRESSURE: 136 MMHG | DIASTOLIC BLOOD PRESSURE: 88 MMHG | BODY MASS INDEX: 27.58 KG/M2

## 2022-03-22 DIAGNOSIS — Z12.31 BREAST CANCER SCREENING BY MAMMOGRAM: Primary | ICD-10-CM

## 2022-03-22 DIAGNOSIS — G43.101 MIGRAINE WITH AURA AND WITH STATUS MIGRAINOSUS, NOT INTRACTABLE: ICD-10-CM

## 2022-03-22 DIAGNOSIS — M35.3 PMR (POLYMYALGIA RHEUMATICA) (HCC): ICD-10-CM

## 2022-03-22 DIAGNOSIS — Z12.11 COLON CANCER SCREENING: ICD-10-CM

## 2022-03-22 DIAGNOSIS — B07.8 PERIUNGUAL WART: ICD-10-CM

## 2022-03-22 DIAGNOSIS — G20 PARKINSON DISEASE (HCC): ICD-10-CM

## 2022-03-22 LAB
C-REACTIVE PROTEIN: <3 MG/L (ref 0–5)
SEDIMENTATION RATE, ERYTHROCYTE: 19 MM/HR (ref 0–30)

## 2022-03-22 PROCEDURE — 99214 OFFICE O/P EST MOD 30 MIN: CPT | Performed by: STUDENT IN AN ORGANIZED HEALTH CARE EDUCATION/TRAINING PROGRAM

## 2022-03-22 PROCEDURE — G8427 DOCREV CUR MEDS BY ELIG CLIN: HCPCS | Performed by: STUDENT IN AN ORGANIZED HEALTH CARE EDUCATION/TRAINING PROGRAM

## 2022-03-22 PROCEDURE — 1036F TOBACCO NON-USER: CPT | Performed by: STUDENT IN AN ORGANIZED HEALTH CARE EDUCATION/TRAINING PROGRAM

## 2022-03-22 PROCEDURE — G8484 FLU IMMUNIZE NO ADMIN: HCPCS | Performed by: STUDENT IN AN ORGANIZED HEALTH CARE EDUCATION/TRAINING PROGRAM

## 2022-03-22 PROCEDURE — G8417 CALC BMI ABV UP PARAM F/U: HCPCS | Performed by: STUDENT IN AN ORGANIZED HEALTH CARE EDUCATION/TRAINING PROGRAM

## 2022-03-22 PROCEDURE — 3017F COLORECTAL CA SCREEN DOC REV: CPT | Performed by: STUDENT IN AN ORGANIZED HEALTH CARE EDUCATION/TRAINING PROGRAM

## 2022-03-22 RX ORDER — FLUOROURACIL 50 MG/G
CREAM TOPICAL
Qty: 40 G | Refills: 0 | Status: SHIPPED | OUTPATIENT
Start: 2022-03-22

## 2022-03-22 NOTE — TELEPHONE ENCOUNTER
Last visit: 3/22/22  Last Med refill: 3/8/22  Does patient have enough medication for 72 hours: Yes    Next Visit Date:  Future Appointments   Date Time Provider Martine Vanessa   7/19/2022  8:40 AM Keo Patel MD Neuro [address] Maintenance   Topic Date Due    Breast cancer screen  Never done    Cervical cancer screen  12/19/2019    Colorectal Cancer Screen  01/22/2020    DTaP/Tdap/Td vaccine (1 - Tdap) 10/14/2022 (Originally 7/23/1976)    Flu vaccine (1) 10/14/2022 (Originally 9/1/2021)    Shingles Vaccine (1 of 2) 10/14/2022 (Originally 7/23/2007)    Pneumococcal 0-64 years Vaccine (1 of 2 - PPSV23) 10/14/2022 (Originally 7/23/1963)    Depression Screen  10/14/2022    Lipid screen  01/22/2024    Diabetes screen  10/22/2024    COVID-19 Vaccine  Completed    Hepatitis C screen  Completed    HIV screen  Completed    Hepatitis A vaccine  Aged Out    Hepatitis B vaccine  Aged Out    Hib vaccine  Aged Out    Meningococcal (ACWY) vaccine  Aged Out       Hemoglobin A1C (%)   Date Value   01/22/2019 5.2             ( goal A1C is < 7)   No results found for: LABMICR  LDL Cholesterol (mg/dL)   Date Value   01/22/2019 119   09/23/2015 137 (H)       (goal LDL is <100)   AST (U/L)   Date Value   10/22/2021 24     ALT (U/L)   Date Value   10/22/2021 11     BUN (mg/dL)   Date Value   10/22/2021 24 (H)     BP Readings from Last 3 Encounters:   03/22/22 136/88   03/08/22 112/62   12/10/21 118/78          (goal 120/80)    All Future Testing planned in [facility]  Lab Frequency Next Occurrence   EEG awake and asleep Once 03/18/2022   ZAIDA DIGITAL SCREEN W OR WO CAD BILATERAL Once 04/19/2022   COLONOSCOPY (Screening) Once 04/19/2022               Patient Active Problem List:     Parkinson's disease ([facility] [address]     Seasonal allergic rhinitis due to pollen     Asthma     Gastroesophageal reflux disease     Neck pain     Cervical radiculopathy     Neuropathy

## 2022-03-22 NOTE — PROGRESS NOTES
Johnny La (:  1955) is a 61 y.o. female,Established patient, here for evaluation of the following chief complaint(s):  Blurred Vision (since discharge from hospital) and Fatigue         ASSESSMENT/PLAN:  1. Breast cancer screening by mammogram  -     ZAIDA DIGITAL SCREEN W OR WO CAD BILATERAL; Future  2. Colon cancer screening  -     COLONOSCOPY (Screening); Future  3. [facility] ([facility]  -     Randy Lim MD, Neurology, [place]  4. PMR (polymyalgia rheumatica) (HCC)  -     Sedimentation Rate; Future  -     C-Reactive Protein; Future  5. Periungual wart  -     fluorouracil (EFUDEX) 5 % cream; Apply topically 2 times daily and wrap with occlusive dressing, Disp-40 g, R-0, Normal  -     salicylic acid 17 % gel; Apply topically 2 times daily to affected finger and wrap with occlusive dressing., Disp-14 g, R-0, Normal    Objectively, there is nothing concerning for stroke  Perhaps her PD meds/seizure meds need to be modified. My concern is that she get in with neurology  I explained that this is outside the scope of my practice. No follow-ups on file. Subjective   SUBJECTIVE/OBJECTIVE:  HPI: 61 F w PD presents with  for weakness  Also having some visual changes    No other issues  Neurological unremarkable    Does have resting tremor made worse with movement, stress    Review of Systems Pertinent positives and negatives included in HPI 14 point ROS otherwise negative         Objective   Physical Exam  Vitals and nursing note reviewed. Constitutional:       Appearance: Normal appearance. HENT:      Head: Normocephalic and atraumatic. Eyes:      Extraocular Movements: Extraocular movements intact. Cardiovascular:      Rate and Rhythm: Normal rate and regular rhythm. Pulses: Normal pulses. Heart sounds: Normal heart sounds. Pulmonary:      Effort: Pulmonary effort is normal.      Breath sounds: Normal breath sounds.    Abdominal:      General: Abdomen is flat. Palpations: Abdomen is soft. Musculoskeletal:         General: Normal range of motion. Cervical back: Normal range of motion and neck supple. Skin:     General: Skin is warm. Neurological:      General: No focal deficit present. Mental Status: She is alert and oriented to person, place, and time. Coordination: Finger-Nose-Finger Test abnormal.      Gait: Gait abnormal.      Comments: Resting tremor   Psychiatric:         Mood and Affect: Mood normal.         Behavior: Behavior normal.         Thought Content: Thought content normal.         Judgment: Judgment normal.                   An electronic signature was used to authenticate this note.     --Klaus Muro MD

## 2022-03-23 RX ORDER — ELETRIPTAN HYDROBROMIDE 40 MG/1
40 TABLET, FILM COATED ORAL
Qty: 12 TABLET | Refills: 3 | Status: SHIPPED | OUTPATIENT
Start: 2022-03-23 | End: 2022-09-01

## 2022-03-24 ENCOUNTER — PATIENT MESSAGE (OUTPATIENT)
Dept: FAMILY MEDICINE CLINIC | Age: 67
End: 2022-03-24

## 2022-03-24 NOTE — TELEPHONE ENCOUNTER
From: Alden Guallpa  To: Dr. Devan Alcantar  Sent: 3/24/2022 10:06 AM EDT  Subject: Dosage    At my visit you question the dosage of the carbidopa here's what's on the bottle

## 2022-04-04 ENCOUNTER — TELEPHONE (OUTPATIENT)
Dept: FAMILY MEDICINE CLINIC | Age: 67
End: 2022-04-04

## 2022-05-03 NOTE — TELEPHONE ENCOUNTER
Pt has been seen by [facility] neurology in April. I called and lm asking her to call us to verify she has changed neurologists.

## 2022-05-05 RX ORDER — LEVETIRACETAM 500 MG/1
TABLET ORAL
Qty: 60 TABLET | OUTPATIENT
Start: 2022-05-05

## 2022-07-25 RX ORDER — TIZANIDINE 4 MG/1
4 TABLET ORAL NIGHTLY
Qty: 90 TABLET | Refills: 0 | Status: SHIPPED | OUTPATIENT
Start: 2022-07-25

## 2022-07-25 NOTE — TELEPHONE ENCOUNTER
*Pharmacy change    Pharmacy requesting refill of tizanidine 4 mg. Medication active on med list yes      Date of last Rx: 3/14/2022 with 3 refills          verified by VILMA CERNA      Date of last appointment 9/21/21    Next Visit Date:  None, message left to schedule follow up appointment.

## 2022-09-01 ENCOUNTER — HOSPITAL ENCOUNTER (OUTPATIENT)
Age: 67
Setting detail: SPECIMEN
Discharge: HOME | End: 2022-09-01

## 2022-09-01 ENCOUNTER — OFFICE VISIT (OUTPATIENT)
Dept: PRIMARY CARE CLINIC | Age: 67
End: 2022-09-01
Payer: COMMERCIAL

## 2022-09-01 ENCOUNTER — TELEPHONE (OUTPATIENT)
Dept: FAMILY MEDICINE CLINIC | Age: 67
End: 2022-09-01

## 2022-09-01 VITALS
WEIGHT: 188 LBS | DIASTOLIC BLOOD PRESSURE: 86 MMHG | SYSTOLIC BLOOD PRESSURE: 124 MMHG | RESPIRATION RATE: 16 BRPM | OXYGEN SATURATION: 97 % | TEMPERATURE: 98.6 F | BODY MASS INDEX: 30.34 KG/M2 | HEART RATE: 84 BPM

## 2022-09-01 DIAGNOSIS — J02.9 SORE THROAT: ICD-10-CM

## 2022-09-01 DIAGNOSIS — J06.9 UPPER RESPIRATORY TRACT INFECTION, UNSPECIFIED TYPE: ICD-10-CM

## 2022-09-01 DIAGNOSIS — R05.9 COUGH: ICD-10-CM

## 2022-09-01 DIAGNOSIS — J02.9 SORE THROAT: Primary | ICD-10-CM

## 2022-09-01 LAB — S PYO AG THROAT QL: NORMAL

## 2022-09-01 PROCEDURE — 87880 STREP A ASSAY W/OPTIC: CPT | Performed by: FAMILY MEDICINE

## 2022-09-01 PROCEDURE — G8427 DOCREV CUR MEDS BY ELIG CLIN: HCPCS | Performed by: FAMILY MEDICINE

## 2022-09-01 PROCEDURE — G8417 CALC BMI ABV UP PARAM F/U: HCPCS | Performed by: FAMILY MEDICINE

## 2022-09-01 PROCEDURE — 1090F PRES/ABSN URINE INCON ASSESS: CPT | Performed by: FAMILY MEDICINE

## 2022-09-01 PROCEDURE — G8400 PT W/DXA NO RESULTS DOC: HCPCS | Performed by: FAMILY MEDICINE

## 2022-09-01 PROCEDURE — 3017F COLORECTAL CA SCREEN DOC REV: CPT | Performed by: FAMILY MEDICINE

## 2022-09-01 PROCEDURE — 1123F ACP DISCUSS/DSCN MKR DOCD: CPT | Performed by: FAMILY MEDICINE

## 2022-09-01 PROCEDURE — 99214 OFFICE O/P EST MOD 30 MIN: CPT | Performed by: FAMILY MEDICINE

## 2022-09-01 PROCEDURE — 1036F TOBACCO NON-USER: CPT | Performed by: FAMILY MEDICINE

## 2022-09-01 RX ORDER — ENTACAPONE 200 MG/1
TABLET ORAL
COMMUNITY
Start: 2022-08-05

## 2022-09-01 RX ORDER — AMANTADINE HYDROCHLORIDE 100 MG/1
CAPSULE, GELATIN COATED ORAL
COMMUNITY
Start: 2022-06-17

## 2022-09-01 NOTE — TELEPHONE ENCOUNTER
Telephone call from patient stating she has had a sore throat for 3-4 days, getting progressively worse. States she now has a headache too. Writer recommended for patient to come to the [facility] for eval and treatment.

## 2022-09-01 NOTE — PROGRESS NOTES
Subjective:  Lasha Cerda presents for   Chief Complaint   Patient presents with    Pharyngitis     Sore throat for 3-4 days. Associated symptoms cough, nasal congestion, fatigue, headache, ear congestion, Denies fever, chest pain, nausea, vomiting, or diarrhea. No gi sx    Slgiht cough    Fluids are good    No strep exposure. Has some nasal congestion. Tylenol helped  little    Ears feel plugged    Patient Active Problem List   Diagnosis    Parkinson's disease ([address]    Seasonal allergic rhinitis due to pollen    Asthma    Gastroesophageal reflux disease    Neck pain    Cervical radiculopathy    Neuropathy           Objective:  Physical Exam   Vitals: Wt Readings from Last 3 Encounters:   09/01/22 188 lb (85.3 kg)   03/22/22 171 lb 9.6 oz (77.8 kg)   03/08/22 177 lb (80.3 kg)     Ht Readings from Last 3 Encounters:   03/22/22 5' 6\" (1.676 m)   03/08/22 5' 7\" (1.702 m)   11/08/21 5' 7\" (1.702 m)     Body mass index is 30.34 kg/m². Vitals:    09/01/22 1157   BP: 124/86   Site: Left Upper Arm   Position: Sitting   Cuff Size: Medium Adult   Pulse: 84   Resp: 16   Temp: 98.6 °F (37 °C)   TempSrc: Tympanic   SpO2: 97%   Weight: 188 lb (85.3 kg)       Constitutional: She is oriented to person, place, and time. She appears well-developed and well-nourished and in no acute distress. Answers all my questions appropriately. Head: Normocephalic and atraumatic. Eyes:conjunctiva appear normal.    Right Ear: External ear normal. TM is clear  Left Ear: External ear normal. TM is clear    Nose: pink, non-edematous mucosa. No polyps. No septal deviation    Throat: no  tonsillar hypertrophy or exudate. Moderate erythema note of the oropharynx  No ulcerations noted. Lips/Teeth/Gums all appear normal.    Neck: Normal range of motion. Neck supple. No tracheal deviation present. No abnormal lymphadenopathy. No JVD noted. Carotids are clear bilaterally. No thyroid masses noted. Heart: RRR without murmur.   No

## 2022-09-02 ENCOUNTER — TELEPHONE (OUTPATIENT)
Dept: FAMILY MEDICINE CLINIC | Age: 67
End: 2022-09-02

## 2022-09-02 LAB
DIRECT EXAM: NORMAL
SPECIMEN DESCRIPTION: NORMAL

## 2022-09-02 NOTE — TELEPHONE ENCOUNTER
Patients strep DNA is negative. This is a viral mediated illness only    Push fluids    Gargle with warm slat water or Cepacol.     Use tylenol

## 2022-09-02 NOTE — TELEPHONE ENCOUNTER
Patient telephoned, asking if she could \"have something prescribed\" for her sore throat. States it feels worse today than yesterday.

## 2022-10-17 DIAGNOSIS — J30.1 SEASONAL ALLERGIC RHINITIS DUE TO POLLEN: ICD-10-CM

## 2022-10-18 RX ORDER — MONTELUKAST SODIUM 10 MG/1
TABLET ORAL
Qty: 90 TABLET | Refills: 3 | OUTPATIENT
Start: 2022-10-18

## 2023-01-05 DIAGNOSIS — K21.9 GASTROESOPHAGEAL REFLUX DISEASE: ICD-10-CM

## 2023-01-05 RX ORDER — OMEPRAZOLE 20 MG/1
CAPSULE, DELAYED RELEASE ORAL
Qty: 90 CAPSULE | Refills: 3 | OUTPATIENT
Start: 2023-01-05

## 2024-03-29 NOTE — TELEPHONE ENCOUNTER
----- Message from Florence Montiel sent at 4/4/2022 11:14 AM EDT -----  Subject: Message to Provider    QUESTIONS  Information for Provider? Pt called in requesting her referral to be sent   over to [phone] to [facility]- Dr Trinity Hernandez. The office advised pt   they have not yet received it and to call the office back to get it sent   over asap. Please advise.   ---------------------------------------------------------------------------  --------------  CALL BACK INFO  What is the best way for the office to contact you? OK to leave message on   voicemail  Preferred Call Back Phone Number? [phone]  ---------------------------------------------------------------------------  --------------  SCRIPT ANSWERS  Relationship to Patient?  Self
Information faxed to number provided
Current every day smoker

## 2024-04-16 ENCOUNTER — HOSPITAL ENCOUNTER (OUTPATIENT)
Age: 69
Discharge: HOME | End: 2024-04-16
Payer: COMMERCIAL

## 2024-04-16 DIAGNOSIS — Z01.818 PRE-OP TESTING: ICD-10-CM

## 2024-04-16 PROBLEM — D48.5 NEOPLASM OF UNCERTAIN BEHAVIOR OF SKIN: Status: ACTIVE | Noted: 2024-04-16

## 2024-04-16 LAB
ANION GAP SERPL CALCULATED.3IONS-SCNC: 10 MMOL/L (ref 9–16)
BUN SERPL-MCNC: 18 MG/DL (ref 8–23)
CALCIUM SERPL-MCNC: 9.6 MG/DL (ref 8.6–10.4)
CHLORIDE SERPL-SCNC: 104 MMOL/L (ref 98–107)
CO2 SERPL-SCNC: 24 MMOL/L (ref 20–31)
CREAT SERPL-MCNC: 0.9 MG/DL (ref 0.5–0.9)
ERYTHROCYTE [DISTWIDTH] IN BLOOD BY AUTOMATED COUNT: 12 % (ref 11.8–14.4)
GFR SERPL CREATININE-BSD FRML MDRD: 70 ML/MIN/1.73M2
GLUCOSE SERPL-MCNC: 105 MG/DL (ref 74–99)
HCT VFR BLD AUTO: 40.3 % (ref 36.3–47.1)
HGB BLD-MCNC: 13.5 G/DL (ref 11.9–15.1)
MCH RBC QN AUTO: 30.5 PG (ref 25.2–33.5)
MCHC RBC AUTO-ENTMCNC: 33.5 G/DL (ref 28.4–34.8)
MCV RBC AUTO: 91 FL (ref 82.6–102.9)
NRBC BLD-RTO: 0 PER 100 WBC
PLATELET # BLD AUTO: 281 K/UL (ref 138–453)
PMV BLD AUTO: 9.8 FL (ref 8.1–13.5)
POTASSIUM SERPL-SCNC: 4 MMOL/L (ref 3.7–5.3)
RBC # BLD AUTO: 4.43 M/UL (ref 3.95–5.11)
SODIUM SERPL-SCNC: 138 MMOL/L (ref 136–145)
WBC OTHER # BLD: 4.5 K/UL (ref 3.5–11.3)

## 2024-04-16 PROCEDURE — 80048 BASIC METABOLIC PNL TOTAL CA: CPT

## 2024-04-16 PROCEDURE — 93005 ELECTROCARDIOGRAM TRACING: CPT | Performed by: PLASTIC SURGERY

## 2024-04-16 PROCEDURE — 36415 COLL VENOUS BLD VENIPUNCTURE: CPT

## 2024-04-16 PROCEDURE — 85027 COMPLETE CBC AUTOMATED: CPT

## 2024-04-17 ENCOUNTER — ANESTHESIA EVENT (OUTPATIENT)
Dept: OPERATING ROOM | Age: 69
End: 2024-04-17
Payer: COMMERCIAL

## 2024-04-17 LAB
EKG ATRIAL RATE: 85 BPM
EKG P AXIS: 49 DEGREES
EKG P-R INTERVAL: 152 MS
EKG Q-T INTERVAL: 390 MS
EKG QRS DURATION: 90 MS
EKG QTC CALCULATION (BAZETT): 464 MS
EKG R AXIS: 25 DEGREES
EKG T AXIS: 42 DEGREES
EKG VENTRICULAR RATE: 85 BPM

## 2024-04-17 PROCEDURE — 93010 ELECTROCARDIOGRAM REPORT: CPT | Performed by: INTERNAL MEDICINE

## 2024-04-17 NOTE — PROGRESS NOTES
Preoperative Instructions:    Stop eating solid foods at midnight the night prior to your surgery.     Stop drinking clear liquids at midnight the night prior to your surgery.    Arrive at the surgery center (3rd entrance) on ____8-36-04___________ by ___1230____________.     Please stop any blood thinning medications as directed by your surgeon or prescribing physician. Failure to stop certain medications may interfere with your scheduled surgery. These may include: Aspirin, Coumadin, Plavix, NSAIDS (Motrin, Aleve, Advil, Mobic, Celebrex), Eliquis, Pradaxa, Xarelto, Fish oil, and herbal supplements.     You may continue the rest of your medications through the night before surgery unless instructed otherwise.     Day of surgery please take only the following medication(s) with a small sips of water:Amantadine, Sinemet. , Comtan, Omeprazole       Please use and bring inhalers the day of surgery. Albuterol   Please shower with antibacterial soap and water the day before and the morning of surgery.     Reminders:  -If you are going home the day of your procedure, you will need a family member or friend to stay during the procedure and drive you home after your procedure. Your  must be 18 years of age or older and able to sign off on your discharge instructions.    -If you are going home the same day of your surgery, someone must remain with you for the first 24 hours after your surgery if you receive sedation or anesthesia.     -Please do not wear any jewelery , lotions, contacts or body piercing the day of surgery

## 2024-04-18 ENCOUNTER — HOSPITAL ENCOUNTER (OUTPATIENT)
Age: 69
Setting detail: OUTPATIENT SURGERY
Discharge: HOME | End: 2024-04-18
Attending: PLASTIC SURGERY | Admitting: PLASTIC SURGERY
Payer: COMMERCIAL

## 2024-04-18 ENCOUNTER — ANESTHESIA (OUTPATIENT)
Dept: OPERATING ROOM | Age: 69
End: 2024-04-18
Payer: COMMERCIAL

## 2024-04-18 VITALS
TEMPERATURE: 97.3 F | HEART RATE: 78 BPM | HEIGHT: 66 IN | SYSTOLIC BLOOD PRESSURE: 132 MMHG | DIASTOLIC BLOOD PRESSURE: 74 MMHG | BODY MASS INDEX: 29.25 KG/M2 | WEIGHT: 182 LBS | RESPIRATION RATE: 10 BRPM | OXYGEN SATURATION: 97 %

## 2024-04-18 DIAGNOSIS — G89.18 PAIN FOLLOWING SURGERY OR PROCEDURE: Primary | ICD-10-CM

## 2024-04-18 DIAGNOSIS — D48.5 NEOPLASM OF UNCERTAIN BEHAVIOR OF SKIN: ICD-10-CM

## 2024-04-18 PROCEDURE — 2500000003 HC RX 250 WO HCPCS: Performed by: PLASTIC SURGERY

## 2024-04-18 PROCEDURE — 88305 TISSUE EXAM BY PATHOLOGIST: CPT

## 2024-04-18 PROCEDURE — 2580000003 HC RX 258: Performed by: ANESTHESIOLOGY

## 2024-04-18 PROCEDURE — 7100000010 HC PHASE II RECOVERY - FIRST 15 MIN: Performed by: PLASTIC SURGERY

## 2024-04-18 PROCEDURE — 6360000002 HC RX W HCPCS: Performed by: REGISTERED NURSE

## 2024-04-18 PROCEDURE — 2709999900 HC NON-CHARGEABLE SUPPLY: Performed by: PLASTIC SURGERY

## 2024-04-18 PROCEDURE — 3600000012 HC SURGERY LEVEL 2 ADDTL 15MIN: Performed by: PLASTIC SURGERY

## 2024-04-18 PROCEDURE — 7100000000 HC PACU RECOVERY - FIRST 15 MIN: Performed by: PLASTIC SURGERY

## 2024-04-18 PROCEDURE — 2500000003 HC RX 250 WO HCPCS: Performed by: REGISTERED NURSE

## 2024-04-18 PROCEDURE — 7100000011 HC PHASE II RECOVERY - ADDTL 15 MIN: Performed by: PLASTIC SURGERY

## 2024-04-18 PROCEDURE — 3700000001 HC ADD 15 MINUTES (ANESTHESIA): Performed by: PLASTIC SURGERY

## 2024-04-18 PROCEDURE — 3600000002 HC SURGERY LEVEL 2 BASE: Performed by: PLASTIC SURGERY

## 2024-04-18 PROCEDURE — 3700000000 HC ANESTHESIA ATTENDED CARE: Performed by: PLASTIC SURGERY

## 2024-04-18 RX ORDER — SODIUM CHLORIDE 9 MG/ML
INJECTION, SOLUTION INTRAVENOUS PRN
Status: DISCONTINUED | OUTPATIENT
Start: 2024-04-18 | End: 2024-04-18 | Stop reason: HOSPADM

## 2024-04-18 RX ORDER — CEFAZOLIN 2 G/1
INJECTION, POWDER, FOR SOLUTION INTRAMUSCULAR; INTRAVENOUS
Status: COMPLETED
Start: 2024-04-18 | End: 2024-04-18

## 2024-04-18 RX ORDER — ONDANSETRON 2 MG/ML
INJECTION INTRAMUSCULAR; INTRAVENOUS PRN
Status: DISCONTINUED | OUTPATIENT
Start: 2024-04-18 | End: 2024-04-18 | Stop reason: SDUPTHER

## 2024-04-18 RX ORDER — SODIUM CHLORIDE 0.9 % (FLUSH) 0.9 %
5-40 SYRINGE (ML) INJECTION PRN
Status: DISCONTINUED | OUTPATIENT
Start: 2024-04-18 | End: 2024-04-18 | Stop reason: HOSPADM

## 2024-04-18 RX ORDER — SODIUM CHLORIDE 9 MG/ML
INJECTION, SOLUTION INTRAVENOUS CONTINUOUS
Status: DISCONTINUED | OUTPATIENT
Start: 2024-04-18 | End: 2024-04-18 | Stop reason: HOSPADM

## 2024-04-18 RX ORDER — LABETALOL HYDROCHLORIDE 5 MG/ML
10 INJECTION, SOLUTION INTRAVENOUS
Status: DISCONTINUED | OUTPATIENT
Start: 2024-04-18 | End: 2024-04-18 | Stop reason: HOSPADM

## 2024-04-18 RX ORDER — LEVODOPA AND CARBIDOPA 95; 23.75 MG/1; MG/1
3 CAPSULE, EXTENDED RELEASE ORAL 4 TIMES DAILY
COMMUNITY

## 2024-04-18 RX ORDER — HYDRALAZINE HYDROCHLORIDE 20 MG/ML
10 INJECTION INTRAMUSCULAR; INTRAVENOUS
Status: DISCONTINUED | OUTPATIENT
Start: 2024-04-18 | End: 2024-04-18 | Stop reason: HOSPADM

## 2024-04-18 RX ORDER — SODIUM CHLORIDE 0.9 % (FLUSH) 0.9 %
5-40 SYRINGE (ML) INJECTION EVERY 12 HOURS SCHEDULED
Status: DISCONTINUED | OUTPATIENT
Start: 2024-04-18 | End: 2024-04-18 | Stop reason: HOSPADM

## 2024-04-18 RX ORDER — BUPIVACAINE HYDROCHLORIDE AND EPINEPHRINE 2.5; 5 MG/ML; UG/ML
INJECTION, SOLUTION EPIDURAL; INFILTRATION; INTRACAUDAL; PERINEURAL PRN
Status: DISCONTINUED | OUTPATIENT
Start: 2024-04-18 | End: 2024-04-18 | Stop reason: ALTCHOICE

## 2024-04-18 RX ORDER — NALOXONE HYDROCHLORIDE 0.4 MG/ML
INJECTION, SOLUTION INTRAMUSCULAR; INTRAVENOUS; SUBCUTANEOUS PRN
Status: DISCONTINUED | OUTPATIENT
Start: 2024-04-18 | End: 2024-04-18 | Stop reason: HOSPADM

## 2024-04-18 RX ORDER — LIDOCAINE HYDROCHLORIDE 10 MG/ML
INJECTION, SOLUTION INFILTRATION; PERINEURAL PRN
Status: DISCONTINUED | OUTPATIENT
Start: 2024-04-18 | End: 2024-04-18 | Stop reason: SDUPTHER

## 2024-04-18 RX ORDER — PROCHLORPERAZINE EDISYLATE 5 MG/ML
5 INJECTION INTRAMUSCULAR; INTRAVENOUS
Status: DISCONTINUED | OUTPATIENT
Start: 2024-04-18 | End: 2024-04-18 | Stop reason: HOSPADM

## 2024-04-18 RX ORDER — PROPOFOL 10 MG/ML
INJECTION, EMULSION INTRAVENOUS PRN
Status: DISCONTINUED | OUTPATIENT
Start: 2024-04-18 | End: 2024-04-18 | Stop reason: SDUPTHER

## 2024-04-18 RX ORDER — SODIUM CHLORIDE, SODIUM LACTATE, POTASSIUM CHLORIDE, CALCIUM CHLORIDE 600; 310; 30; 20 MG/100ML; MG/100ML; MG/100ML; MG/100ML
INJECTION, SOLUTION INTRAVENOUS CONTINUOUS
Status: DISCONTINUED | OUTPATIENT
Start: 2024-04-18 | End: 2024-04-18 | Stop reason: HOSPADM

## 2024-04-18 RX ORDER — FENTANYL CITRATE 50 UG/ML
INJECTION, SOLUTION INTRAMUSCULAR; INTRAVENOUS PRN
Status: DISCONTINUED | OUTPATIENT
Start: 2024-04-18 | End: 2024-04-18 | Stop reason: SDUPTHER

## 2024-04-18 RX ORDER — DEXAMETHASONE SODIUM PHOSPHATE 10 MG/ML
INJECTION, SOLUTION INTRAMUSCULAR; INTRAVENOUS PRN
Status: DISCONTINUED | OUTPATIENT
Start: 2024-04-18 | End: 2024-04-18 | Stop reason: SDUPTHER

## 2024-04-18 RX ORDER — CEPHALEXIN 500 MG/1
500 CAPSULE ORAL 3 TIMES DAILY
Qty: 21 CAPSULE | Refills: 0 | Status: SHIPPED | OUTPATIENT
Start: 2024-04-18 | End: 2024-04-25

## 2024-04-18 RX ORDER — HYDROCODONE BITARTRATE AND ACETAMINOPHEN 5; 325 MG/1; MG/1
1 TABLET ORAL EVERY 6 HOURS PRN
Qty: 28 TABLET | Refills: 0 | Status: SHIPPED | OUTPATIENT
Start: 2024-04-18 | End: 2024-04-25

## 2024-04-18 RX ORDER — CEFAZOLIN SODIUM 1 G/3ML
INJECTION, POWDER, FOR SOLUTION INTRAMUSCULAR; INTRAVENOUS PRN
Status: DISCONTINUED | OUTPATIENT
Start: 2024-04-18 | End: 2024-04-18 | Stop reason: SDUPTHER

## 2024-04-18 RX ADMIN — SODIUM CHLORIDE, POTASSIUM CHLORIDE, SODIUM LACTATE AND CALCIUM CHLORIDE: 600; 310; 30; 20 INJECTION, SOLUTION INTRAVENOUS at 13:31

## 2024-04-18 RX ADMIN — PROPOFOL 150 MG: 10 INJECTION, EMULSION INTRAVENOUS at 16:22

## 2024-04-18 RX ADMIN — LIDOCAINE HYDROCHLORIDE 40 MG: 10 INJECTION, SOLUTION INFILTRATION; PERINEURAL at 16:22

## 2024-04-18 RX ADMIN — ONDANSETRON 4 MG: 2 INJECTION INTRAMUSCULAR; INTRAVENOUS at 16:32

## 2024-04-18 RX ADMIN — CEFAZOLIN 2 G: 1 INJECTION, POWDER, FOR SOLUTION INTRAMUSCULAR; INTRAVENOUS at 16:28

## 2024-04-18 RX ADMIN — FENTANYL CITRATE 100 MCG: 50 INJECTION, SOLUTION INTRAMUSCULAR; INTRAVENOUS at 16:22

## 2024-04-18 RX ADMIN — DEXAMETHASONE SODIUM PHOSPHATE 5 MG: 10 INJECTION, SOLUTION INTRAMUSCULAR; INTRAVENOUS at 16:32

## 2024-04-18 ASSESSMENT — PAIN SCALES - GENERAL
PAINLEVEL_OUTOF10: 0
PAINLEVEL_OUTOF10: 0

## 2024-04-18 NOTE — H&P
Office Note     Jerod Tejada, APRN-LISA Caban MD     Subjective:      Patient ID: Nikole Card is a 68 y.o. female.     HPI     Patient comes in today for consultation for a skin lesion located on her left neck. She reports that she first noticed the lesion about 4 weeks ago and it has rapidly grown in size. It is painful. She denies drainage or erythema. She is here for evaluation and treatment options.      Review of Systems     Past Medical History        Past Medical History:   Diagnosis Date    Allergic rhinitis      Asthma      Bursitis       bilateral hips    Cervical radiculopathy 9/17/2020    GERD (gastroesophageal reflux disease)      Headache(784.0)      Narcolepsy      Parkinson disease (HCC)      Tremor           Past Surgical History         Past Surgical History:   Procedure Laterality Date    FACIAL RECONSTRUCTION SURGERY   1987         No Known Allergies  Current Facility-Administered Medications          Current Outpatient Medications   Medication Sig Dispense Refill    entacapone (COMTAN) 200 MG tablet Take 1 tablet by mouth 4 times daily 8,12,4,8 with Carbidopa/levadopa.        amantadine (SYMMETREL) 100 MG capsule          tiZANidine (ZANAFLEX) 4 MG tablet Take 1 tablet by mouth nightly 90 tablet 0    eletriptan (RELPAX) 40 MG tablet Take 1 tablet by mouth once as needed (migraine) may repeat in 2 hours if necessary 12 tablet 3    fluorouracil (EFUDEX) 5 % cream Apply topically 2 times daily and wrap with occlusive dressing 40 g 0    salicylic acid 17 % gel Apply topically 2 times daily to affected finger and wrap with occlusive dressing. 14 g 0    levETIRAcetam (KEPPRA) 500 MG tablet take 1 tablet by mouth twice a day (Patient not taking: Reported on 9/1/2022)        omeprazole (PRILOSEC) 20 MG delayed release capsule TAKE 1 CAPSULE DAILY 90 capsule 3    carbidopa-levodopa (SINEMET CR)  MG per extended release tablet TAKE 1 TABLET NIGHTLY 90 tablet 3     note reviewed.   Constitutional:       Appearance: Normal appearance. She is normal weight.   HENT:      Head: Normocephalic and atraumatic.      Nose: Nose normal.      Mouth/Throat:      Mouth: Mucous membranes are moist.      Pharynx: Oropharynx is clear.   Eyes:      Pupils: Pupils are equal, round, and reactive to light.   Cardiovascular:      Rate and Rhythm: Normal rate and regular rhythm.      Pulses: Normal pulses.      Heart sounds: Normal heart sounds.   Pulmonary:      Effort: Pulmonary effort is normal.      Breath sounds: Normal breath sounds.   Abdominal:      General: Abdomen is flat. Bowel sounds are normal.      Palpations: Abdomen is soft.   Musculoskeletal:         General: Normal range of motion.      Cervical back: Normal range of motion and neck supple.   Skin:     General: Skin is warm and dry.      Capillary Refill: Capillary refill takes less than 2 seconds.      Findings: Lesion present.      Comments: 1.5 x 2 cm raised exophytic and scaly lesion left neck. There is a central ulceration present. No erythema or drainage. Slightly tender to palpation. See attached picture.    Neurological:      General: No focal deficit present.      Mental Status: She is alert and oriented to person, place, and time.   Psychiatric:         Mood and Affect: Mood normal.         Behavior: Behavior normal.         Thought Content: Thought content normal.         Judgment: Judgment normal.              Assessment:          ICD-10-CM     1. Neoplasm of uncertain behavior of skin  D48.5                             Plan:      Treatment options discussed in detail with the patient. I recommend excision of the skin lesion left neck with flap closure. Risks discussed. The patient would like to proceed. All questions answered. We will get the patient scheduled for surgery with Dr. Caban at their convenience.      The proposed procedure was discussed with the patient at great length.  The risks of the procedure

## 2024-04-18 NOTE — ANESTHESIA POSTPROCEDURE EVALUATION
Department of Anesthesiology  Postprocedure Note    Patient: Nikole Card  MRN: [id number]  YOB: 1955  Date of evaluation: 4/18/2024    Procedure Summary       Date: 04/18/24 Room / Location: [address]    Anesthesia Start: 1619 Anesthesia Stop: 1655    Procedure: LEFT NECK LESION BIOPSY EXCISION WITH FLAP CLOSURE (Left) Diagnosis:       Neoplasm of uncertain behavior of skin      (Neoplasm of uncertain behavior of skin [D48.5])    Surgeons: VITA Caban MD Responsible Provider: Ingrid Fontanez MD    Anesthesia Type: general ASA Status: 3            Anesthesia Type: No value filed.    Iggy Phase I: Iggy Score: 7    Iggy Phase II: Iggy Score: 10    Anesthesia Post Evaluation    Patient location during evaluation: PACU  Patient participation: complete - patient participated  Level of consciousness: awake and awake and alert  Pain score: 1  Nausea & Vomiting: no nausea and no vomiting  Cardiovascular status: hemodynamically stable and blood pressure returned to baseline  Respiratory status: acceptable  Hydration status: euvolemic  Multimodal analgesia pain management approach  Pain management: adequate    No notable events documented.

## 2024-04-18 NOTE — ANESTHESIA PRE PROCEDURE
soln infusion   IntraVENous Continuous Katia Melton MD        sodium chloride flush 0.9 % injection 5-40 mL  5-40 mL IntraVENous 2 times per day Katia Melton MD        sodium chloride flush 0.9 % injection 5-40 mL  5-40 mL IntraVENous PRN Katia Melton MD        0.9 % sodium chloride infusion   IntraVENous PRN Katia Melton MD           Allergies:  No Known Allergies    Problem List:    Patient Active Problem List   Diagnosis Code    Parkinson's disease (HCC) G20.A1    Seasonal allergic rhinitis due to pollen J30.1    Asthma J45.909    Gastroesophageal reflux disease K21.9    Neck pain M54.2    Cervical radiculopathy M54.12    Neuropathy G62.9    Neoplasm of uncertain behavior of skin D48.5       Past Medical History:        Diagnosis Date    Allergic rhinitis     Asthma     Bursitis     bilateral hips    Cervical radiculopathy 9/17/2020    GERD (gastroesophageal reflux disease)     Headache(784.0)     Narcolepsy     Parkinson disease (HCC)     Tremor        Past Surgical History:        Procedure Laterality Date    FACIAL RECONSTRUCTION SURGERY  01/01/1987    from trauma       Social History:    Social History     Tobacco Use    Smoking status: Never     Passive exposure: Yes    Smokeless tobacco: Never   Substance Use Topics    Alcohol use: Yes     Comment: occasionally- rarely                                Counseling given: Not Answered      Vital Signs (Current):   Vitals:    04/17/24 1042   Weight: 81.6 kg (180 lb)                                              BP Readings from Last 3 Encounters:   09/01/22 124/86   03/22/22 136/88   03/08/22 112/62       NPO Status: Time of last liquid consumption: 2300                        Time of last solid consumption: 2300                        Date of last liquid consumption: 04/17/24                        Date of last solid food consumption: 04/17/24    BMI:   Wt Readings from Last 3 Encounters:   04/17/24 81.6 kg (180 lb)   09/01/22 85.3 kg (188 lb)   03/22/22 77.8 kg (171 lb 9.6

## 2024-04-22 LAB — SURGICAL PATHOLOGY REPORT: NORMAL

## 2024-04-29 PROBLEM — L85.8: Status: ACTIVE | Noted: 2024-04-29

## 2024-04-29 NOTE — OP NOTE
Operative Note      Patient: Nikole Card  YOB: 1955  MRN: [id number]    Date of Procedure: 4/18/2024    Pre-Op Diagnosis Codes:     * Neoplasm of uncertain behavior of skin [D48.5]    Post-Op Diagnosis: Same       Procedure(s):  LEFT NECK LESION BIOPSY EXCISION WITH FLAP CLOSURE    Excision of large exophytic lesion suspicious for squamous cell carcinoma left base of neck, excised diameter 4 x 5 cm closed with a 7 x 9 cm rotational flap closure    Surgeon(s):  VITA Caban MD    Assistant:   * No surgical staff found *    Anesthesia: General    Estimated Blood Loss (mL): Minimal    Complications: None    Specimens:   ID Type Source Tests Collected by Time Destination   A : tumor left neck Tissue Tissue SURGICAL PATHOLOGY VITA Caban MD 4/18/2024 1636        Implants:  * No implants in log *      Drains: * No LDAs found *    Findings:  Infection Present At Time Of Surgery (PATOS) (choose all levels that have infection present):  No infection present  Other Findings: 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  Her face and neck were prepped and draped in sterile fashion.  The large exophytic lesion was identified on the left base of the neck.  A minimum of 1 cm margins were marked all the way around.  0.25% Marcaine with epinephrine was injected into the tissue surrounding the lesion.  #15 blade was used to incise the skin surrounding the lesion.  Dissection was carried with Bovie cautery and the lesion was removed in its entirety.  This was submitted for specimen.  Total excised diameter measuring 4 x 5 cm.  The defect was much too large to close primarily.  A large rotational flap was diagrammed with a surgical marker and incised with a #15 blade.  The flap was

## 2025-05-01 ENCOUNTER — HOSPITAL ENCOUNTER (EMERGENCY)
Age: 70
Discharge: HOME | End: 2025-05-01
Attending: EMERGENCY MEDICINE
Payer: MEDICARE

## 2025-05-01 ENCOUNTER — APPOINTMENT (OUTPATIENT)
Dept: GENERAL RADIOLOGY | Age: 70
End: 2025-05-01
Payer: MEDICARE

## 2025-05-01 VITALS
BODY MASS INDEX: 30.12 KG/M2 | OXYGEN SATURATION: 97 % | SYSTOLIC BLOOD PRESSURE: 154 MMHG | WEIGHT: 187.39 LBS | HEART RATE: 81 BPM | RESPIRATION RATE: 25 BRPM | TEMPERATURE: 98.2 F | HEIGHT: 66 IN | DIASTOLIC BLOOD PRESSURE: 99 MMHG

## 2025-05-01 DIAGNOSIS — R07.9 CHEST PAIN, UNSPECIFIED TYPE: Primary | ICD-10-CM

## 2025-05-01 LAB
ALBUMIN SERPL-MCNC: 4.8 G/DL (ref 3.5–5.2)
ALBUMIN/GLOB SERPL: 1.4 {RATIO} (ref 1–2.5)
ALP SERPL-CCNC: 112 U/L (ref 35–104)
ALT SERPL-CCNC: <5 U/L (ref 5–33)
ANION GAP SERPL CALCULATED.3IONS-SCNC: 13 MMOL/L (ref 9–17)
AST SERPL-CCNC: 19 U/L
BASOPHILS # BLD: 0 K/UL (ref 0–0.2)
BASOPHILS NFR BLD: 1 % (ref 0–2)
BILIRUB SERPL-MCNC: 0.2 MG/DL (ref 0.3–1.2)
BNP SERPL-MCNC: 150 PG/ML
BUN SERPL-MCNC: 17 MG/DL (ref 8–23)
CALCIUM SERPL-MCNC: 10 MG/DL (ref 8.6–10.4)
CHLORIDE SERPL-SCNC: 102 MMOL/L (ref 98–107)
CO2 SERPL-SCNC: 25 MMOL/L (ref 20–31)
CREAT SERPL-MCNC: 1 MG/DL (ref 0.5–0.9)
D DIMER PPP FEU-MCNC: <0.27 UG/ML FEU (ref 0–0.59)
EOSINOPHIL # BLD: 0.2 K/UL (ref 0–0.4)
EOSINOPHILS RELATIVE PERCENT: 4 % (ref 1–4)
ERYTHROCYTE [DISTWIDTH] IN BLOOD BY AUTOMATED COUNT: 13.2 % (ref 12.5–15.4)
GFR, ESTIMATED: 62 ML/MIN/1.73M2
GLUCOSE SERPL-MCNC: 106 MG/DL (ref 70–99)
HCT VFR BLD AUTO: 41.1 % (ref 36–46)
HGB BLD-MCNC: 13.9 G/DL (ref 12–16)
LIPASE SERPL-CCNC: 24 U/L (ref 13–60)
LYMPHOCYTES NFR BLD: 1.7 K/UL (ref 1–4.8)
LYMPHOCYTES RELATIVE PERCENT: 30 % (ref 24–44)
MAGNESIUM SERPL-MCNC: 2.1 MG/DL (ref 1.6–2.6)
MCH RBC QN AUTO: 30.4 PG (ref 26–34)
MCHC RBC AUTO-ENTMCNC: 33.7 G/DL (ref 31–37)
MCV RBC AUTO: 90.3 FL (ref 80–100)
MONOCYTES NFR BLD: 0.3 K/UL (ref 0.1–1.2)
MONOCYTES NFR BLD: 5 % (ref 2–11)
NEUTROPHILS NFR BLD: 60 % (ref 36–66)
NEUTS SEG NFR BLD: 3.5 K/UL (ref 1.8–7.7)
PLATELET # BLD AUTO: 274 K/UL (ref 140–450)
PMV BLD AUTO: 7.3 FL (ref 6–12)
POTASSIUM SERPL-SCNC: 4.5 MMOL/L (ref 3.7–5.3)
PROT SERPL-MCNC: 8.2 G/DL (ref 6.4–8.3)
RBC # BLD AUTO: 4.55 M/UL (ref 4–5.2)
SODIUM SERPL-SCNC: 140 MMOL/L (ref 135–144)
TROPONIN I SERPL HS-MCNC: <6 NG/L (ref 0–14)
TROPONIN I SERPL HS-MCNC: <6 NG/L (ref 0–14)
WBC OTHER # BLD: 5.7 K/UL (ref 3.5–11)

## 2025-05-01 PROCEDURE — 85379 FIBRIN DEGRADATION QUANT: CPT

## 2025-05-01 PROCEDURE — 83690 ASSAY OF LIPASE: CPT

## 2025-05-01 PROCEDURE — 83735 ASSAY OF MAGNESIUM: CPT

## 2025-05-01 PROCEDURE — 99285 EMERGENCY DEPT VISIT HI MDM: CPT

## 2025-05-01 PROCEDURE — 83880 ASSAY OF NATRIURETIC PEPTIDE: CPT

## 2025-05-01 PROCEDURE — 93005 ELECTROCARDIOGRAM TRACING: CPT | Performed by: EMERGENCY MEDICINE

## 2025-05-01 PROCEDURE — 84484 ASSAY OF TROPONIN QUANT: CPT

## 2025-05-01 PROCEDURE — 80053 COMPREHEN METABOLIC PANEL: CPT

## 2025-05-01 PROCEDURE — 71045 X-RAY EXAM CHEST 1 VIEW: CPT

## 2025-05-01 PROCEDURE — 36415 COLL VENOUS BLD VENIPUNCTURE: CPT

## 2025-05-01 PROCEDURE — 85025 COMPLETE CBC W/AUTO DIFF WBC: CPT

## 2025-05-01 RX ORDER — CARBIDOPA/LEVODOPA 10MG-100MG
1 TABLET ORAL 4 TIMES DAILY
COMMUNITY

## 2025-05-01 ASSESSMENT — ENCOUNTER SYMPTOMS
ABDOMINAL PAIN: 0
NAUSEA: 0
COUGH: 0
DIARRHEA: 0
EYE PAIN: 0
VOMITING: 0
SORE THROAT: 0
BACK PAIN: 0
RHINORRHEA: 0

## 2025-05-01 ASSESSMENT — PAIN DESCRIPTION - LOCATION: LOCATION: CHEST

## 2025-05-01 ASSESSMENT — PAIN SCALES - GENERAL: PAINLEVEL_OUTOF10: 8

## 2025-05-01 ASSESSMENT — PAIN DESCRIPTION - PAIN TYPE: TYPE: ACUTE PAIN

## 2025-05-01 ASSESSMENT — PAIN - FUNCTIONAL ASSESSMENT: PAIN_FUNCTIONAL_ASSESSMENT: 0-10

## 2025-05-01 ASSESSMENT — PAIN DESCRIPTION - DESCRIPTORS: DESCRIPTORS: HEAVINESS

## 2025-05-01 NOTE — ED PROVIDER NOTES
[facility] Emergency Department  [address]  [place] [address]  Phone: [phone]  Fax: [phone]      Patient Name:  Nikole Card  Medical Record Number:  [id number]  YOB: 1955  Date of Service:  5/1/2025  Primary Care Physician:  Real Santillan MD      CHIEF COMPLAINT:       Chief Complaint   Patient presents with    Chest Pain     Midsternal and left sided chest pains.  Nagging pains x4 days.  Pt describes lifting heavy object than pain started several hours later.         HISTORY OF PRESENT ILLNESS:    Nikole Card is a 69 y.o. female who presents with chest discomfort for the past 3 days.  States it began after she was lifting a heavy box.  Certain movements make it worse.  Rest makes a little better.  She reports minimal dyspnea but that seems to be more exertional and positional.  No leg swelling.  No blunt trauma.  No fevers.  No cough.  Denies any prior cardiac history.  She does have Parkinson's and takes carbidopa levodopa.  Denies other symptoms or concern    REVIEW OF SYSTEMS:     Review of Systems   Constitutional:  Negative for chills, fatigue and fever.   HENT:  Negative for rhinorrhea and sore throat.    Eyes:  Negative for pain.   Respiratory:  Negative for cough.    Cardiovascular:  Positive for chest pain.   Gastrointestinal:  Negative for abdominal pain, diarrhea, nausea and vomiting.   Genitourinary:  Negative for difficulty urinating.   Musculoskeletal:  Negative for back pain and neck pain.   Skin:  Negative for rash.   Neurological:  Negative for weakness and headaches.         CURRENT MEDICATIONS:      Previous Medications    ALBUTEROL SULFATE HFA (VENTOLIN HFA) 108 (90 BASE) MCG/ACT INHALER    Inhale 2 puffs into the lungs every 6 hours as needed for Wheezing    AMANTADINE (SYMMETREL) 100 MG CAPSULE    2 times daily    CARBIDOPA-LEVODOPA (SINEMET)  MG PER TABLET    Take 1 tablet by mouth 4 times daily    CARBIDOPA-LEVODOPA ER (RYTARY)

## 2025-05-02 LAB
EKG ATRIAL RATE: 83 BPM
EKG P AXIS: 33 DEGREES
EKG P-R INTERVAL: 136 MS
EKG Q-T INTERVAL: 378 MS
EKG QRS DURATION: 92 MS
EKG QTC CALCULATION (BAZETT): 444 MS
EKG R AXIS: -4 DEGREES
EKG T AXIS: 31 DEGREES
EKG VENTRICULAR RATE: 83 BPM

## 2025-05-02 PROCEDURE — 93010 ELECTROCARDIOGRAM REPORT: CPT | Performed by: INTERNAL MEDICINE

## 2025-05-02 NOTE — DISCHARGE INSTRUCTIONS
PLEASE RETURN TO THE EMERGENCY DEPARTMENT IMMEDIATELY if your symptoms worsen in anyway or in 8-12 hours if not improved for re-evaluation.  You should immediately return to the ER for symptoms such as increasing pain, shortness of breath, fever, a feeling of passing out, light headed, dizziness, abdominal pain, numbness or weakness to the arms or legs, coolness or color change of the arms or legs.      Take your medication as indicated and prescribed.  If you are given an antibiotic then, make sure you get the prescription filled and take the antibiotics until finished.      Please understand that at this time there is no evidence for a more serious underlying process, but that early in the process of an illness or injury, an emergency department workup can be falsely reassuring.  You should contact your family doctor within the next 24 hours for a follow up appointment    THANK YOU!!!    From [facility] and [facility]    On behalf of the Emergency Department staff at [facility], I would like to thank you for giving us the opportunity to address your health care needs and concerns.    We hope that during your visit, our service was delivered in a professional and caring manner. Please keep [facility] in mind as we walk with you down the path to your own personal wellness.     Please expect an automated text message or email from us so we can ask a few questions about your health and progress. Based on your answers, a clinician may call you back to offer help and instructions.    Please understand that early in the process of an illness or injury, an emergency department workup can be falsely reassuring.  If you notice any worsening, changing or persistent symptoms please call your family doctor or return to the ER immediately.     Tell us how we did during your visit at [email or web address]   and let us know about your experience

## 2025-08-15 ENCOUNTER — OFFICE VISIT (OUTPATIENT)
Dept: FAMILY MEDICINE CLINIC | Age: 70
End: 2025-08-15
Payer: MEDICARE

## 2025-08-15 VITALS
BODY MASS INDEX: 29.41 KG/M2 | WEIGHT: 183 LBS | SYSTOLIC BLOOD PRESSURE: 142 MMHG | TEMPERATURE: 97 F | DIASTOLIC BLOOD PRESSURE: 82 MMHG

## 2025-08-15 DIAGNOSIS — K21.9 GASTROESOPHAGEAL REFLUX DISEASE, UNSPECIFIED WHETHER ESOPHAGITIS PRESENT: ICD-10-CM

## 2025-08-15 DIAGNOSIS — H92.01 EAR PAIN, RIGHT: Primary | ICD-10-CM

## 2025-08-15 DIAGNOSIS — J45.20 MILD INTERMITTENT ASTHMA WITHOUT COMPLICATION: ICD-10-CM

## 2025-08-15 PROCEDURE — 99213 OFFICE O/P EST LOW 20 MIN: CPT | Performed by: INTERNAL MEDICINE

## 2025-08-15 PROCEDURE — 1123F ACP DISCUSS/DSCN MKR DOCD: CPT | Performed by: INTERNAL MEDICINE

## 2025-08-15 PROCEDURE — 1159F MED LIST DOCD IN RCRD: CPT | Performed by: INTERNAL MEDICINE

## 2025-08-15 RX ORDER — CARBIDOPA AND LEVODOPA 25; 100 MG/1; MG/1
TABLET, EXTENDED RELEASE ORAL
COMMUNITY
Start: 2025-06-16

## 2025-08-15 RX ORDER — ALBUTEROL SULFATE 90 UG/1
2 INHALANT RESPIRATORY (INHALATION) EVERY 6 HOURS PRN
Qty: 1 EACH | Refills: 2 | Status: SHIPPED | OUTPATIENT
Start: 2025-08-15 | End: 2026-08-15

## 2025-08-15 RX ORDER — CETIRIZINE HYDROCHLORIDE 10 MG/1
10 TABLET ORAL DAILY
Qty: 10 TABLET | Refills: 1 | Status: SHIPPED | OUTPATIENT
Start: 2025-08-15

## 2025-08-15 RX ORDER — CARBIDOPA AND LEVODOPA 25; 100 MG/1; MG/1
TABLET ORAL
COMMUNITY
Start: 2025-05-09

## 2025-08-15 RX ORDER — OMEPRAZOLE 20 MG/1
20 CAPSULE, DELAYED RELEASE ORAL DAILY
Qty: 90 CAPSULE | Refills: 3 | Status: SHIPPED | OUTPATIENT
Start: 2025-08-15

## 2025-08-15 SDOH — ECONOMIC STABILITY: FOOD INSECURITY: WITHIN THE PAST 12 MONTHS, THE FOOD YOU BOUGHT JUST DIDN'T LAST AND YOU DIDN'T HAVE MONEY TO GET MORE.: NEVER TRUE

## 2025-08-15 SDOH — ECONOMIC STABILITY: FOOD INSECURITY: WITHIN THE PAST 12 MONTHS, YOU WORRIED THAT YOUR FOOD WOULD RUN OUT BEFORE YOU GOT MONEY TO BUY MORE.: NEVER TRUE

## 2025-08-15 ASSESSMENT — PATIENT HEALTH QUESTIONNAIRE - PHQ9
SUM OF ALL RESPONSES TO PHQ QUESTIONS 1-9: 1
2. FEELING DOWN, DEPRESSED OR HOPELESS: SEVERAL DAYS
SUM OF ALL RESPONSES TO PHQ QUESTIONS 1-9: 1
SUM OF ALL RESPONSES TO PHQ QUESTIONS 1-9: 1
1. LITTLE INTEREST OR PLEASURE IN DOING THINGS: NOT AT ALL
SUM OF ALL RESPONSES TO PHQ QUESTIONS 1-9: 1

## (undated) DEVICE — STERILE POLYISOPRENE POWDER-FREE SURGICAL GLOVES: Brand: PROTEXIS

## (undated) DEVICE — SUTURE MONOCRYL SZ 3 0 L18IN ABSRB UD PS 2 3 8 CIR REV CUT NDL MCP497G

## (undated) DEVICE — ELECTRODE PT RET AD L9FT HI MOIST COND ADH HYDRGEL CORDED

## (undated) DEVICE — INTENDED FOR TISSUE SEPARATION, AND OTHER PROCEDURES THAT REQUIRE A SHARP SURGICAL BLADE TO PUNCTURE OR CUT.: Brand: BARD-PARKER ® CARBON RIB-BACK BLADES

## (undated) DEVICE — STERILE POLYISOPRENE POWDER-FREE SURGICAL GLOVES WITH EMOLLIENT COATING: Brand: PROTEXIS

## (undated) DEVICE — SOLUTION IRRIG 1000ML 0.9% SOD CHL USP POUR PLAS BTL

## (undated) DEVICE — LIQUIBAND RAPID ADHESIVE 36/CS 0.8ML: Brand: MEDLINE

## (undated) DEVICE — ELECTRODE ELECSURG NDL 2.8 INX7.2 CM COAT INSUL EDGE

## (undated) DEVICE — SOLUTION SCRB 4OZ 7.5% POVIDONE IOD ANTIMIC BTL

## (undated) DEVICE — MHPB HEAD AND NECK  PACK: Brand: MEDLINE INDUSTRIES, INC.